# Patient Record
Sex: FEMALE | Race: WHITE | NOT HISPANIC OR LATINO | Employment: OTHER | URBAN - METROPOLITAN AREA
[De-identification: names, ages, dates, MRNs, and addresses within clinical notes are randomized per-mention and may not be internally consistent; named-entity substitution may affect disease eponyms.]

---

## 2019-03-13 DIAGNOSIS — Z00.00 ENCOUNTER FOR WELLNESS EXAMINATION: Primary | ICD-10-CM

## 2019-04-25 ENCOUNTER — OFFICE VISIT (OUTPATIENT)
Dept: OBSTETRICS AND GYNECOLOGY | Facility: CLINIC | Age: 74
End: 2019-04-25
Payer: MEDICARE

## 2019-04-25 VITALS
DIASTOLIC BLOOD PRESSURE: 64 MMHG | HEART RATE: 79 BPM | BODY MASS INDEX: 22.88 KG/M2 | HEIGHT: 68 IN | SYSTOLIC BLOOD PRESSURE: 103 MMHG | WEIGHT: 151 LBS

## 2019-04-25 DIAGNOSIS — N89.8 VAGINAL DISCHARGE: Primary | ICD-10-CM

## 2019-04-25 DIAGNOSIS — Z01.419 PAP SMEAR, AS PART OF ROUTINE GYNECOLOGICAL EXAMINATION: ICD-10-CM

## 2019-04-25 PROCEDURE — G0101 CA SCREEN;PELVIC/BREAST EXAM: HCPCS | Mod: S$GLB,,, | Performed by: OBSTETRICS & GYNECOLOGY

## 2019-04-25 PROCEDURE — G0101 PR CA SCREEN;PELVIC/BREAST EXAM: ICD-10-PCS | Mod: S$GLB,,, | Performed by: OBSTETRICS & GYNECOLOGY

## 2019-04-25 RX ORDER — BLOOD SUGAR DIAGNOSTIC
STRIP MISCELLANEOUS
COMMUNITY
Start: 2019-03-18 | End: 2022-01-20 | Stop reason: SDUPTHER

## 2019-04-25 RX ORDER — DULOXETIN HYDROCHLORIDE 60 MG/1
CAPSULE, DELAYED RELEASE ORAL
COMMUNITY
Start: 2019-02-27 | End: 2022-01-06

## 2019-04-25 RX ORDER — TRAZODONE HYDROCHLORIDE 100 MG/1
TABLET ORAL
COMMUNITY
Start: 2019-01-26 | End: 2022-01-20

## 2019-04-25 RX ORDER — LEVOCETIRIZINE DIHYDROCHLORIDE 5 MG/1
TABLET, FILM COATED ORAL
COMMUNITY
Start: 2019-03-16 | End: 2022-01-20 | Stop reason: SDUPTHER

## 2019-04-25 RX ORDER — BENAZEPRIL HYDROCHLORIDE 5 MG/1
TABLET ORAL
COMMUNITY
Start: 2019-04-02 | End: 2022-01-06

## 2019-04-25 RX ORDER — MONTELUKAST SODIUM 10 MG/1
TABLET ORAL
COMMUNITY
Start: 2019-02-13

## 2019-04-25 RX ORDER — GABAPENTIN 600 MG/1
TABLET ORAL
COMMUNITY
Start: 2019-03-16 | End: 2022-01-07

## 2019-04-25 RX ORDER — HYDROCODONE BITARTRATE AND ACETAMINOPHEN 7.5; 325 MG/1; MG/1
TABLET ORAL
COMMUNITY
Start: 2019-01-18 | End: 2022-01-20

## 2019-04-25 RX ORDER — INSULIN DEGLUDEC 100 U/ML
INJECTION, SOLUTION SUBCUTANEOUS
COMMUNITY
Start: 2019-03-15 | End: 2022-01-20

## 2019-04-25 NOTE — PROGRESS NOTES
Subjective:       Patient ID: Yoko Mann is a 73 y.o. female.    Chief Complaint:  Well Woman and Gynecologic Exam      History of Present Illness  Pt here for gyn annual.  History and past labs reviewed with patient.    Complaints none. Reports some vaginal discharge occasionally       Review of Systems  Review of Systems   Constitutional: Negative for chills and fever.   Respiratory: Negative for shortness of breath.    Cardiovascular: Negative for chest pain.   Gastrointestinal: Negative for abdominal pain, blood in stool, constipation, diarrhea, nausea, vomiting and reflux.   Genitourinary: Positive for vaginal discharge. Negative for dysuria, hematuria, hot flashes, pelvic pain, vaginal bleeding and vaginal dryness.   Musculoskeletal: Negative for arthralgias and joint swelling.   Integumentary:  Negative for rash, hair changes, breast mass, nipple discharge and breast skin changes.   Psychiatric/Behavioral: Negative for depression. The patient is not nervous/anxious.    Breast: Negative for asymmetry, lump, mass, nipple discharge and skin changes          Objective:    Physical Exam:   Constitutional: She appears well-developed and well-nourished. No distress.    HENT:   Head: Normocephalic and atraumatic.    Eyes: Conjunctivae and EOM are normal.    Neck: No tracheal deviation present. No thyromegaly present.    Cardiovascular: Exam reveals no clubbing, no cyanosis and no edema.     Pulmonary/Chest: Effort normal. No respiratory distress.        Abdominal: Soft. She exhibits no distension and no mass. There is no tenderness. There is no rebound and no guarding. No hernia.     Genitourinary: Rectum normal. Pelvic exam was performed with patient supine. There is no rash, tenderness, lesion or injury on the right labia. There is no rash, tenderness, lesion or injury on the left labia. Uterus is absent. Cervix is normal. Right adnexum displays no mass, no tenderness and no fullness. Left adnexum displays no  mass, no tenderness and no fullness. Vaginal discharge (yellow) found.                Skin: She is not diaphoretic. No cyanosis. Nails show no clubbing.         breast exam wnl- no nipple dc, skin changes, masses or lymph nodes palpated bilaterally    Assessment:        1. Vaginal discharge    2. Pap smear, as part of routine gynecological examination                Plan:      Annual   Pap today   MMG UTD   BMD ordered by PCP    Vaccine UTD   Risk assessment for inherited gyn cancer done - neg   RTC  1 year

## 2019-04-25 NOTE — LETTER
April 25, 2019      Abdulaziz Guerrero MD  [address] [address]           [facility]  [address]   [place] [address]  Phone: [phone]  Fax: [phone]          Patient: Yoko Mann   MR Number: [id number]   YOB: 1945   Date of Visit: 4/25/2019       Dear Dr. Abdulaziz Guerrero:    Thank you for referring Yoko Mann to me for evaluation. Attached you will find relevant portions of my assessment and plan of care.    If you have questions, please do not hesitate to call me. I look forward to following Yoko Mann along with you.    Sincerely,    Naz Slade MD    Enclosure  CC:  No Recipients    If you would like to receive this communication electronically, please contact [email or web address] or [phone] to request more information on [employer] [facility] access.    For providers and/or their staff who would like to refer a patient to [facility], please contact us through our one-stop-shop provider referral line, [facility], at [phone].    If you feel you have received this communication in error or would no longer like to receive these types of communications, please e-mail [email or web address]

## 2019-06-27 DIAGNOSIS — N89.8 VAGINAL DISCHARGE: Primary | ICD-10-CM

## 2019-06-27 RX ORDER — METRONIDAZOLE 500 MG/1
500 TABLET ORAL EVERY 12 HOURS
Qty: 14 TABLET | Refills: 0 | Status: SHIPPED | OUTPATIENT
Start: 2019-06-27 | End: 2019-07-04

## 2022-01-06 ENCOUNTER — OFFICE VISIT (OUTPATIENT)
Dept: FAMILY MEDICINE | Facility: CLINIC | Age: 77
End: 2022-01-06
Payer: MEDICARE

## 2022-01-06 VITALS
SYSTOLIC BLOOD PRESSURE: 124 MMHG | DIASTOLIC BLOOD PRESSURE: 69 MMHG | HEART RATE: 90 BPM | WEIGHT: 147 LBS | OXYGEN SATURATION: 96 % | HEIGHT: 68 IN | BODY MASS INDEX: 22.28 KG/M2 | RESPIRATION RATE: 18 BRPM | TEMPERATURE: 98 F

## 2022-01-06 DIAGNOSIS — Z79.4 TYPE 2 DIABETES MELLITUS WITH HYPERGLYCEMIA, WITH LONG-TERM CURRENT USE OF INSULIN: Primary | ICD-10-CM

## 2022-01-06 DIAGNOSIS — Z11.59 ENCOUNTER FOR SCREENING FOR OTHER VIRAL DISEASES: ICD-10-CM

## 2022-01-06 DIAGNOSIS — I10 ESSENTIAL HYPERTENSION: ICD-10-CM

## 2022-01-06 DIAGNOSIS — R07.81 RIB PAIN ON LEFT SIDE: ICD-10-CM

## 2022-01-06 DIAGNOSIS — E78.2 MIXED HYPERLIPIDEMIA: ICD-10-CM

## 2022-01-06 DIAGNOSIS — E11.65 TYPE 2 DIABETES MELLITUS WITH HYPERGLYCEMIA, WITH LONG-TERM CURRENT USE OF INSULIN: Primary | ICD-10-CM

## 2022-01-06 LAB
ABS NRBC COUNT: 0 X 10 3/UL (ref 0–0.01)
ABSOLUTE BASOPHIL: 0.07 X 10 3/UL (ref 0–0.22)
ABSOLUTE EOSINOPHIL: 0.14 X 10 3/UL (ref 0.04–0.54)
ABSOLUTE IMMATURE GRAN: 0.01 X 10 3/UL (ref 0–0.04)
ABSOLUTE LYMPHOCYTE: 2.09 X 10 3/UL (ref 0.86–4.75)
ABSOLUTE MONOCYTE: 0.68 X 10 3/UL (ref 0.22–1.08)
ALBUMIN SERPL-MCNC: 4.7 G/DL (ref 3.5–5.2)
ALBUMIN/GLOB SERPL ELPH: 1.8 {RATIO} (ref 1–2.7)
ALP ISOS SERPL LEV INH-CCNC: 90 U/L (ref 35–105)
ALT (SGPT): 22 U/L (ref 0–33)
ANION GAP SERPL CALC-SCNC: 12 MMOL/L (ref 8–17)
AST SERPL-CCNC: 23 U/L (ref 0–32)
BASOPHILS NFR BLD: 1.1 % (ref 0.2–1.2)
BILIRUBIN, TOTAL: 0.3 MG/DL (ref 0–1.2)
BUN/CREAT SERPL: 10 (ref 6–20)
CALCIUM SERPL-MCNC: 10.3 MG/DL (ref 8.6–10.2)
CARBON DIOXIDE, CO2: 28 MMOL/L (ref 22–29)
CHLORIDE: 102 MMOL/L (ref 98–107)
CHOLEST SERPL-MSCNC: 268 MG/DL (ref 100–200)
CREAT SERPL-MCNC: 0.81 MG/DL (ref 0.5–0.9)
EOSINOPHIL NFR BLD: 2.1 % (ref 0.7–7)
ESTIMATED AVERAGE GLUCOSE: 206 MG/DL
GFR ESTIMATION: 68.93
GLOBULIN: 2.6 G/DL (ref 1.5–4.5)
GLUCOSE: 233 MG/DL (ref 82–115)
HBA1C MFR BLD: 8.8 % (ref 4–6)
HCT VFR BLD AUTO: 40.4 % (ref 37–47)
HCV IGG SERPL QL IA: NONREACTIVE
HDLC SERPL-MCNC: 58 MG/DL
HGB BLD-MCNC: 13.1 G/DL (ref 12–16)
IMMATURE GRANULOCYTES: 0.2 % (ref 0–0.5)
LDL/HDL RATIO: 2.3 (ref 1–3)
LDLC SERPL CALC-MCNC: 135.2 MG/DL (ref 0–100)
LYMPHOCYTES NFR BLD: 32.1 % (ref 19.3–53.1)
MCH RBC QN AUTO: 29.5 PG (ref 27–32)
MCHC RBC AUTO-ENTMCNC: 32.4 G/DL (ref 32–36)
MCV RBC AUTO: 91 FL (ref 82–100)
MONOCYTES NFR BLD: 10.4 % (ref 4.7–12.5)
NEUTROPHILS # BLD AUTO: 3.53 X 10 3/UL (ref 2.15–7.56)
NEUTROPHILS NFR BLD: 54.1 % (ref 34–71.1)
NUCLEATED RED BLOOD CELLS: 0 /100 WBC (ref 0–0.2)
PLATELET # BLD AUTO: 249 X 10 3/UL (ref 135–400)
POTASSIUM: 4.3 MMOL/L (ref 3.5–5.1)
PROT SNV-MCNC: 7.3 G/DL (ref 6.4–8.3)
RBC # BLD AUTO: 4.44 X 10 6/UL (ref 4.2–5.4)
RDW-SD: 43.1 FL (ref 37–54)
SODIUM: 142 MMOL/L (ref 136–145)
TRIGL SERPL-MCNC: 374 MG/DL (ref 0–150)
TSH W/REFLEX TO FT4: 1.4 UIU/ML (ref 0.27–4.2)
UREA NITROGEN (BUN): 8.1 MG/DL (ref 8–23)
WBC # BLD: 6.52 X 10 3/UL (ref 4.3–10.8)

## 2022-01-06 PROCEDURE — 99203 OFFICE O/P NEW LOW 30 MIN: CPT | Mod: S$GLB,,, | Performed by: NURSE PRACTITIONER

## 2022-01-06 PROCEDURE — 99203 PR OFFICE/OUTPT VISIT, NEW, LEVL III, 30-44 MIN: ICD-10-PCS | Mod: S$GLB,,, | Performed by: NURSE PRACTITIONER

## 2022-01-06 RX ORDER — BENAZEPRIL HYDROCHLORIDE 10 MG/1
TABLET ORAL
COMMUNITY
Start: 2021-12-09

## 2022-01-06 RX ORDER — TRAZODONE HYDROCHLORIDE 150 MG/1
150 TABLET ORAL DAILY PRN
COMMUNITY
Start: 2021-12-20 | End: 2022-04-01 | Stop reason: SDUPTHER

## 2022-01-06 RX ORDER — GLIMEPIRIDE 2 MG/1
TABLET ORAL
COMMUNITY
Start: 2021-12-09 | End: 2022-01-20

## 2022-01-06 RX ORDER — DULAGLUTIDE 1.5 MG/.5ML
INJECTION, SOLUTION SUBCUTANEOUS
COMMUNITY
Start: 2021-12-16

## 2022-01-06 RX ORDER — LEFLUNOMIDE 20 MG/1
20 TABLET ORAL DAILY
COMMUNITY
Start: 2021-12-16

## 2022-01-06 RX ORDER — EXENATIDE 2 MG/.85ML
INJECTION, SUSPENSION, EXTENDED RELEASE SUBCUTANEOUS
COMMUNITY
Start: 2021-10-25 | End: 2022-01-20

## 2022-01-06 RX ORDER — DULOXETIN HYDROCHLORIDE 30 MG/1
30 CAPSULE, DELAYED RELEASE ORAL DAILY
COMMUNITY
Start: 2021-10-25 | End: 2022-01-06

## 2022-01-06 RX ORDER — CLOBETASOL PROPIONATE 0.46 MG/ML
SOLUTION TOPICAL
COMMUNITY
Start: 2021-07-16

## 2022-01-06 RX ORDER — CLOBETASOL PROPIONATE 0.5 MG/G
OINTMENT TOPICAL
COMMUNITY
Start: 2021-12-09

## 2022-01-06 NOTE — PROGRESS NOTES
Subjective:       Patient ID: Yoko Mann is a 76 y.o. female.    Chief Complaint: Establish Care (Pt wants to establish care. Pt states she's unhappy with her previous provider. Pt states she broke 5 ribs and they're not healing well. Pt says she still has some sore spots. Pt states she's also having some thoractic back pain. Pt also says she falls a lot. Pt says she has done physical therapy twice. Pt says she wants better health management./)    HPI     Here to establish primary care. She has PMH DM2, depression, allergies, HTN, insomnia.     Broke 5 ribs in Oct 2021 from a fall and was evalated at [facility] 2-3 day. No injury to lung. Still having left sided right rib pain.     Established with interventional pain management Dr Ceballos in late dec, xrays showed fx still present but healing. She has a return appt with Dr Ceballos on Feb 3, 2022.    She does not know the names of all of her medications. She did not bring her medications. She does not carry a medication list. Medical history is very unclear. We will attempt to obtain historical medical records from her previous PCP.   She thinks her last  A1c was around 8%      Review of Systems   Constitutional: Negative for activity change, appetite change and fever.   Respiratory: Negative for chest tightness and shortness of breath.    Cardiovascular: Negative for chest pain and palpitations.   Gastrointestinal: Negative for abdominal pain, diarrhea, nausea and vomiting.   Musculoskeletal: Positive for arthralgias and back pain. Negative for joint swelling and myalgias.   Skin: Negative for color change and rash.   Neurological: Negative for dizziness, weakness, light-headedness and headaches.   Hematological: Negative for adenopathy.   Psychiatric/Behavioral: Negative for dysphoric mood and sleep disturbance. The patient is not nervous/anxious.            Past Medical History:  Past Medical History:   Diagnosis Date    Depression     Diabetes mellitus        Past Surgical History:   Procedure Laterality Date    BACK SURGERY      eyelid      FOOT SURGERY      bilat    HYSTERECTOMY      NECK SURGERY      x2      Review of patient's allergies indicates:   Allergen Reactions    Bee pollens     Penicillins       Current Outpatient Medications   Medication Sig Dispense Refill    levocetirizine (XYZAL) 5 MG tablet       montelukast (SINGULAIR) 10 mg tablet       traZODone (DESYREL) 100 MG tablet       ACCU-CHEK FRANTZ PLUS METER Misc       ACCU-CHEK FRANTZ PLUS TEST STRP Strp       benazepriL (LOTENSIN) 10 MG tablet TAKE 1/2 TABLET BY MOUTH DAILY thank youmike -)      BYDUREON BCISE 2 mg/0.85 mL AtIn INJECT 2 MG UNDER THE SKIN ONCE WEEKLY      clobetasoL (TEMOVATE) 0.05 % external solution APPLY TO THE AFFECTED AREA ON SCALP TWICE DAILY      clobetasol 0.05% (TEMOVATE) 0.05 % Oint APPLY to ear and trunk 2 TIMES A DAY AS NEEDED thank chele -)      glimepiride (AMARYL) 2 MG tablet TAKE 1 TABLET BY MOUTH 2 TIMES A DAY thank youmijami -)      HYDROcodone-acetaminophen (NORCO) 7.5-325 mg per tablet       leflunomide (ARAVA) 20 MG Tab Take 20 mg by mouth once daily.      traZODone (DESYREL) 150 MG tablet Take 150 mg by mouth daily as needed.      TRESIBA FLEXTOUCH U-100 100 unit/mL (3 mL) InPn       TRULICITY 1.5 mg/0.5 mL pen injector INJECT 1.5 MG UNDER THE SKIN ONCE A WEEK       No current facility-administered medications for this visit.     Social History     Socioeconomic History    Marital status: Single   Tobacco Use    Smoking status: Former Smoker     Types: Cigarettes    Smokeless tobacco: Never Used    Tobacco comment: quit 35 years ago   Substance and Sexual Activity    Alcohol use: Not Currently    Drug use: Never    Sexual activity: Not Currently     Partners: Male      Family History   Problem Relation Age of Onset    Diabetes Paternal Grandfather     Diabetes Paternal Grandmother     Tuberculosis Maternal Grandmother     Diabetes  Father     Cancer Father     Anuerysm Mother     Prostate cancer Brother     Stomach cancer Brother     Stroke Brother         Objective:      Physical Exam  Constitutional:       Appearance: She is well-developed and well-nourished.   HENT:      Head: Normocephalic and atraumatic.      Mouth/Throat:      Mouth: Oropharynx is clear and moist.   Eyes:      General: No scleral icterus.     Extraocular Movements: EOM normal.      Conjunctiva/sclera: Conjunctivae normal.      Pupils: Pupils are equal, round, and reactive to light.   Neck:      Thyroid: No thyroid mass.      Trachea: Trachea normal.   Cardiovascular:      Rate and Rhythm: Normal rate and regular rhythm.   Pulmonary:      Effort: Pulmonary effort is normal.      Breath sounds: Normal breath sounds.   Musculoskeletal:         General: No edema.      Cervical back: Normal range of motion and neck supple.   Skin:     General: Skin is warm and dry.   Neurological:      Mental Status: She is alert and oriented to person, place, and time.   Psychiatric:         Mood and Affect: Mood and affect and mood normal.         Behavior: Behavior normal.         Assessment:     1. Type 2 diabetes mellitus with hyperglycemia, with long-term current use of insulin    2. Essential hypertension    3. Mixed hyperlipidemia    4. Rib pain on left side    5. Encounter for screening for other viral diseases      Plan:       PROBLEM LIST     Type 2 diabetes mellitus with hyperglycemia, with long-term current use of insulin  -     CBC Auto Differential  -     Comprehensive Metabolic Panel  -     Lipid Panel  -     TSH w/reflex to FT4  -     Hemoglobin A1C    Essential hypertension  -     CBC Auto Differential  -     Comprehensive Metabolic Panel  -     Lipid Panel  -     TSH w/reflex to FT4  -     Hemoglobin A1C    Mixed hyperlipidemia    Rib pain on left side    Encounter for screening for other viral diseases  -     Hepatitis C antibody        She is unsure of all  medications that she is taking. She will RTC in 2 weeks and bring ALL medications with her. We will also attempt to obtain her historical medical records from her previous provider. In the meantime, will obtain baseline labs.

## 2022-01-20 ENCOUNTER — OFFICE VISIT (OUTPATIENT)
Dept: FAMILY MEDICINE | Facility: CLINIC | Age: 77
End: 2022-01-20
Payer: MEDICARE

## 2022-01-20 VITALS
BODY MASS INDEX: 22.58 KG/M2 | TEMPERATURE: 97 F | OXYGEN SATURATION: 96 % | RESPIRATION RATE: 18 BRPM | DIASTOLIC BLOOD PRESSURE: 64 MMHG | WEIGHT: 149 LBS | SYSTOLIC BLOOD PRESSURE: 121 MMHG | HEART RATE: 94 BPM | HEIGHT: 68 IN

## 2022-01-20 DIAGNOSIS — E11.65 TYPE 2 DIABETES MELLITUS WITH HYPERGLYCEMIA, WITH LONG-TERM CURRENT USE OF INSULIN: Primary | Chronic | ICD-10-CM

## 2022-01-20 DIAGNOSIS — I10 ESSENTIAL HYPERTENSION: Chronic | ICD-10-CM

## 2022-01-20 DIAGNOSIS — Z79.4 TYPE 2 DIABETES MELLITUS WITH HYPERGLYCEMIA, WITH LONG-TERM CURRENT USE OF INSULIN: Primary | Chronic | ICD-10-CM

## 2022-01-20 DIAGNOSIS — M06.9 RHEUMATOID ARTHRITIS INVOLVING MULTIPLE SITES, UNSPECIFIED WHETHER RHEUMATOID FACTOR PRESENT: Chronic | ICD-10-CM

## 2022-01-20 DIAGNOSIS — E78.2 MIXED HYPERLIPIDEMIA: Chronic | ICD-10-CM

## 2022-01-20 DIAGNOSIS — J30.2 SEASONAL ALLERGIES: ICD-10-CM

## 2022-01-20 DIAGNOSIS — R07.81 RIB PAIN ON LEFT SIDE: ICD-10-CM

## 2022-01-20 PROCEDURE — 99214 OFFICE O/P EST MOD 30 MIN: CPT | Mod: S$GLB,,, | Performed by: NURSE PRACTITIONER

## 2022-01-20 PROCEDURE — 99214 PR OFFICE/OUTPT VISIT, EST, LEVL IV, 30-39 MIN: ICD-10-PCS | Mod: S$GLB,,, | Performed by: NURSE PRACTITIONER

## 2022-01-20 RX ORDER — CELECOXIB 200 MG/1
200 CAPSULE ORAL 2 TIMES DAILY PRN
COMMUNITY
End: 2022-04-01

## 2022-01-20 RX ORDER — BLOOD SUGAR DIAGNOSTIC
1 STRIP MISCELLANEOUS 2 TIMES DAILY
Qty: 200 STRIP | Refills: 3 | Status: SHIPPED | OUTPATIENT
Start: 2022-01-20

## 2022-01-20 RX ORDER — LEVOCETIRIZINE DIHYDROCHLORIDE 5 MG/1
5 TABLET, FILM COATED ORAL NIGHTLY
Qty: 90 TABLET | Refills: 3 | Status: SHIPPED | OUTPATIENT
Start: 2022-01-20

## 2022-01-20 RX ORDER — DULOXETIN HYDROCHLORIDE 60 MG/1
60 CAPSULE, DELAYED RELEASE ORAL DAILY
COMMUNITY

## 2022-01-20 RX ORDER — GLIMEPIRIDE 4 MG/1
4 TABLET ORAL 2 TIMES DAILY
Qty: 180 TABLET | Refills: 3 | Status: SHIPPED | OUTPATIENT
Start: 2022-01-20

## 2022-01-20 RX ORDER — MINOCYCLINE HYDROCHLORIDE 100 MG/1
100 CAPSULE ORAL 2 TIMES DAILY
COMMUNITY

## 2022-01-20 RX ORDER — GLIMEPIRIDE 4 MG/1
4 TABLET ORAL
Qty: 180 TABLET | Refills: 3 | Status: SHIPPED | OUTPATIENT
Start: 2022-01-20 | End: 2022-01-20 | Stop reason: SDUPTHER

## 2022-01-20 RX ORDER — INSULIN GLARGINE 100 [IU]/ML
15 INJECTION, SOLUTION SUBCUTANEOUS NIGHTLY
Qty: 13.5 ML | Refills: 3 | Status: SHIPPED | OUTPATIENT
Start: 2022-01-20 | End: 2023-01-20

## 2022-01-20 RX ORDER — ATORVASTATIN CALCIUM 20 MG/1
20 TABLET, FILM COATED ORAL DAILY
Qty: 90 TABLET | Refills: 3 | Status: SHIPPED | OUTPATIENT
Start: 2022-01-20 | End: 2023-01-20

## 2022-01-20 NOTE — PROGRESS NOTES
Subjective:       Patient ID: Yoko Mann is a 76 y.o. female.    Chief Complaint: Follow-up (Pt is here for a follow up and to go over lab results.)    HPI     Here to establish primary care. She has PMH DM2, depression, allergies, HTN, HLD, insomnia.      Broke 5 ribs in Oct 2021 from a fall and was evalated at [facility] 2-3 day. No injury to lung. Still having left sided right rib pain.      Established with interventional pain management Dr Ceballos in late dec, xrays showed fx still present but healing. She has a return appt with Dr Ceballos on Feb 3, 2022.    She does not feel that her diabetes has been controlled. She believes her last A1c was around 8.0%. She reports receiving several samples of insulins at her previous pcp--but was never told how to take the medication or how much to take. She has several sample syringes with her today of Soliqua Tresiba...       Review of Systems   Constitutional: Negative for activity change, appetite change and fever.   Respiratory: Negative for chest tightness and shortness of breath.    Cardiovascular: Negative for chest pain and palpitations.   Gastrointestinal: Negative for abdominal pain, diarrhea, nausea and vomiting.   Musculoskeletal: Positive for arthralgias and back pain. Negative for joint swelling and myalgias.   Skin: Negative for color change and rash.   Neurological: Negative for dizziness, weakness, light-headedness and headaches.   Hematological: Negative for adenopathy.   Psychiatric/Behavioral: Negative for dysphoric mood and sleep disturbance. The patient is not nervous/anxious.            Past Medical History:  Past Medical History:   Diagnosis Date    Depression     Diabetes mellitus       Past Surgical History:   Procedure Laterality Date    BACK SURGERY      eyelid      FOOT SURGERY      bilat    HYSTERECTOMY      NECK SURGERY      x2      Review of patient's allergies indicates:   Allergen Reactions    Bee pollens     Penicillins        Current Outpatient Medications   Medication Sig Dispense Refill    ACCU-CHEK FRANTZ PLUS METER Misc       benazepriL (LOTENSIN) 10 MG tablet TAKE 1/2 TABLET BY MOUTH DAILY thank chele -)      celecoxib (CELEBREX) 200 MG capsule Take 200 mg by mouth 2 (two) times daily as needed for Pain.      clobetasoL (TEMOVATE) 0.05 % external solution APPLY TO THE AFFECTED AREA ON SCALP TWICE DAILY      clobetasol 0.05% (TEMOVATE) 0.05 % Oint APPLY to ear and trunk 2 TIMES A DAY AS NEEDED thank chele -)      DULoxetine (CYMBALTA) 60 MG capsule Take 60 mg by mouth once daily.      leflunomide (ARAVA) 20 MG Tab Take 20 mg by mouth once daily.      minocycline (MINOCIN,DYNACIN) 100 MG capsule Take 100 mg by mouth 2 (two) times daily.      montelukast (SINGULAIR) 10 mg tablet       traZODone (DESYREL) 150 MG tablet Take 150 mg by mouth daily as needed.      TRULICITY 1.5 mg/0.5 mL pen injector INJECT 1.5 MG UNDER THE SKIN ONCE A WEEK      ACCU-CHEK FRANTZ PLUS TEST STRP Strp 1 strip by abdominal subcutaneous route 2 (two) times a day. 200 strip 3    atorvastatin (LIPITOR) 20 MG tablet Take 1 tablet (20 mg total) by mouth once daily. 90 tablet 3    glimepiride (AMARYL) 4 MG tablet Take 1 tablet (4 mg total) by mouth 2 (two) times a day. 180 tablet 3    insulin (LANTUS SOLOSTAR U-100 INSULIN) glargine 100 units/mL (3mL) SubQ pen Inject 15 Units into the skin every evening. 13.5 mL 3    levocetirizine (XYZAL) 5 MG tablet Take 1 tablet (5 mg total) by mouth every evening. 90 tablet 3     No current facility-administered medications for this visit.     Social History     Socioeconomic History    Marital status: Single   Tobacco Use    Smoking status: Former Smoker     Types: Cigarettes    Smokeless tobacco: Never Used    Tobacco comment: quit 35 years ago   Substance and Sexual Activity    Alcohol use: Not Currently    Drug use: Never    Sexual activity: Not Currently     Partners: Male      Family History    Problem Relation Age of Onset    Diabetes Paternal Grandfather     Diabetes Paternal Grandmother     Tuberculosis Maternal Grandmother     Diabetes Father     Cancer Father     Anuerysm Mother     Prostate cancer Brother     Stomach cancer Brother     Stroke Brother         Objective:      Physical Exam  Constitutional:       Appearance: She is well-developed.   HENT:      Head: Normocephalic and atraumatic.   Eyes:      General: No scleral icterus.     Conjunctiva/sclera: Conjunctivae normal.      Pupils: Pupils are equal, round, and reactive to light.   Neck:      Thyroid: No thyroid mass.      Trachea: Trachea normal.   Cardiovascular:      Rate and Rhythm: Normal rate and regular rhythm.   Pulmonary:      Effort: Pulmonary effort is normal.      Breath sounds: Normal breath sounds.   Musculoskeletal:      Cervical back: Normal range of motion and neck supple.   Skin:     General: Skin is warm and dry.   Neurological:      Mental Status: She is alert and oriented to person, place, and time.   Psychiatric:         Mood and Affect: Mood normal.         Behavior: Behavior normal.         Assessment:     1. Type 2 diabetes mellitus with hyperglycemia, with long-term current use of insulin Sub-optimally controlled   2. Essential hypertension Stable   3. Mixed hyperlipidemia Active   4. Rheumatoid arthritis involving multiple sites, unspecified whether rheumatoid factor present Stable   5. Seasonal allergies    6. Rib pain on left side      Plan:       PROBLEM LIST     Type 2 diabetes mellitus with hyperglycemia, with long-term current use of insulin  Comments:  A1c 8.8%--increase glimepiride; start basal insulin; continue Trulicity; repeat A1c in 12 wk  Orders:  -     Discontinue: glimepiride (AMARYL) 4 MG tablet; Take 1 tablet (4 mg total) by mouth daily with breakfast.  Dispense: 180 tablet; Refill: 3  -     insulin (LANTUS SOLOSTAR U-100 INSULIN) glargine 100 units/mL (3mL) SubQ pen; Inject 15 Units into  the skin every evening.  Dispense: 13.5 mL; Refill: 3  -     ACCU-CHEK FRANTZ PLUS TEST STRP Strp; 1 strip by abdominal subcutaneous route 2 (two) times a day.  Dispense: 200 strip; Refill: 3  -     atorvastatin (LIPITOR) 20 MG tablet; Take 1 tablet (20 mg total) by mouth once daily.  Dispense: 90 tablet; Refill: 3  -     glimepiride (AMARYL) 4 MG tablet; Take 1 tablet (4 mg total) by mouth 2 (two) times a day.  Dispense: 180 tablet; Refill: 3    Essential hypertension    Mixed hyperlipidemia  Comments:  start Atorvastatin hs    Rheumatoid arthritis involving multiple sites, unspecified whether rheumatoid factor present    Seasonal allergies  -     levocetirizine (XYZAL) 5 MG tablet; Take 1 tablet (5 mg total) by mouth every evening.  Dispense: 90 tablet; Refill: 3    Rib pain on left side  Comments:  interventional pain mgmt appt Scheduled w/ Dr Ceballos Feb 3    reviewed lab results w/ her in clinic today

## 2022-04-01 ENCOUNTER — OFFICE VISIT (OUTPATIENT)
Dept: FAMILY MEDICINE | Facility: CLINIC | Age: 77
End: 2022-04-01
Payer: MEDICARE

## 2022-04-01 VITALS
TEMPERATURE: 97 F | WEIGHT: 136 LBS | HEART RATE: 78 BPM | BODY MASS INDEX: 20.61 KG/M2 | HEIGHT: 68 IN | OXYGEN SATURATION: 95 % | RESPIRATION RATE: 18 BRPM

## 2022-04-01 DIAGNOSIS — M54.6 PAIN IN THORACIC SPINE: ICD-10-CM

## 2022-04-01 DIAGNOSIS — Z13.820 OSTEOPOROSIS SCREENING: ICD-10-CM

## 2022-04-01 DIAGNOSIS — M81.0 OSTEOPOROSIS, POST-MENOPAUSAL: ICD-10-CM

## 2022-04-01 DIAGNOSIS — F51.01 PRIMARY INSOMNIA: Primary | ICD-10-CM

## 2022-04-01 DIAGNOSIS — Z91.199 MEDICALLY NONCOMPLIANT: ICD-10-CM

## 2022-04-01 PROCEDURE — 99213 OFFICE O/P EST LOW 20 MIN: CPT | Mod: S$GLB,,, | Performed by: NURSE PRACTITIONER

## 2022-04-01 PROCEDURE — 99213 PR OFFICE/OUTPT VISIT, EST, LEVL III, 20-29 MIN: ICD-10-PCS | Mod: S$GLB,,, | Performed by: NURSE PRACTITIONER

## 2022-04-01 RX ORDER — TRAZODONE HYDROCHLORIDE 150 MG/1
150 TABLET ORAL DAILY PRN
Qty: 30 TABLET | Refills: 3 | Status: SHIPPED | OUTPATIENT
Start: 2022-04-01

## 2022-04-01 RX ORDER — GABAPENTIN 300 MG/1
300 CAPSULE ORAL 2 TIMES DAILY
COMMUNITY
Start: 2022-03-31

## 2022-04-01 RX ORDER — MINOCYCLINE HYDROCHLORIDE 100 MG/1
100 CAPSULE ORAL 2 TIMES DAILY
Qty: 60 CAPSULE | Refills: 3 | Status: CANCELLED | OUTPATIENT
Start: 2022-04-01

## 2022-04-01 NOTE — PROGRESS NOTES
Subjective:       Patient ID: Yoko Mann is a 76 y.o. female.    Chief Complaint: Follow-up (Pt is here for a check up. Pt states is concerned about her recent weight loss. Pt states she had some steroid shots in her back, neck, and hand and she believes it made her sugar shoot up. Pt also needs a refill.)    She has PMH DM2, depression, allergies, HTN, HLD, insomnia.      Broke 5 ribs in Oct 2021 from a fall and was evalated at [facility] 2-3 day. No injury to lung. Still having left sided right rib pain.      Established with interventional pain management Dr Ceballos in late dec, xrays showed fx still present but healing. Recently received cervical injection from Dr Ceballos.     At her previous appt, her A1c was 8.8%. Her diabetic meds were adjusted and she was started on a basal insulin. Today her A1c is 7.5%    Today her main complaint is thoracic back pain. Present since Oct 2021. She is requesting MRI. I told her that we need to try physical therapy first. She was angry, rolling her eyes while I am talking to her. She is resistant to physical therapy or any other suggestions. She is argumentative during our entire visit. I tried discussed the importance of health screens. She is resistant to all recommendations. At this juncture, I have asked her to find a different provider.            Review of Systems   Constitutional: Negative for activity change, appetite change and fever.   Respiratory: Negative for chest tightness and shortness of breath.    Cardiovascular: Negative for chest pain and palpitations.   Gastrointestinal: Negative for abdominal pain, diarrhea, nausea and vomiting.   Musculoskeletal: Positive for arthralgias and back pain. Negative for joint swelling and myalgias.   Skin: Negative for color change and rash.   Neurological: Negative for dizziness, weakness, light-headedness and headaches.   Hematological: Negative for adenopathy.   Psychiatric/Behavioral: Negative for dysphoric mood and  sleep disturbance. The patient is not nervous/anxious.            Past Medical History:  Past Medical History:   Diagnosis Date    Depression     Diabetes mellitus       Past Surgical History:   Procedure Laterality Date    BACK SURGERY      eyelid      FOOT SURGERY      bilat    HYSTERECTOMY      NECK SURGERY      x2      Review of patient's allergies indicates:   Allergen Reactions    Bee pollens     Penicillins       Current Outpatient Medications   Medication Sig Dispense Refill    ACCU-CHEK FRANTZ PLUS METER Misc       ACCU-CHEK FRANTZ PLUS TEST STRP Strp 1 strip by abdominal subcutaneous route 2 (two) times a day. 200 strip 3    atorvastatin (LIPITOR) 20 MG tablet Take 1 tablet (20 mg total) by mouth once daily. 90 tablet 3    benazepriL (LOTENSIN) 10 MG tablet TAKE 1/2 TABLET BY MOUTH DAILY thank chele -)      clobetasoL (TEMOVATE) 0.05 % external solution APPLY TO THE AFFECTED AREA ON SCALP TWICE DAILY      clobetasol 0.05% (TEMOVATE) 0.05 % Oint APPLY to ear and trunk 2 TIMES A DAY AS NEEDED thank chele -)      DULoxetine (CYMBALTA) 60 MG capsule Take 60 mg by mouth once daily.      gabapentin (NEURONTIN) 300 MG capsule Take 300 mg by mouth 2 (two) times a day.      glimepiride (AMARYL) 4 MG tablet Take 1 tablet (4 mg total) by mouth 2 (two) times a day. 180 tablet 3    insulin (LANTUS SOLOSTAR U-100 INSULIN) glargine 100 units/mL (3mL) SubQ pen Inject 15 Units into the skin every evening. 13.5 mL 3    leflunomide (ARAVA) 20 MG Tab Take 20 mg by mouth once daily.      levocetirizine (XYZAL) 5 MG tablet Take 1 tablet (5 mg total) by mouth every evening. 90 tablet 3    minocycline (MINOCIN,DYNACIN) 100 MG capsule Take 100 mg by mouth 2 (two) times daily.      montelukast (SINGULAIR) 10 mg tablet       TRULICITY 1.5 mg/0.5 mL pen injector INJECT 1.5 MG UNDER THE SKIN ONCE A WEEK      celecoxib (CELEBREX) 200 MG capsule Take 200 mg by mouth 2 (two) times daily as needed for Pain.       traZODone (DESYREL) 150 MG tablet Take 1 tablet (150 mg total) by mouth daily as needed for Insomnia. 30 tablet 3     No current facility-administered medications for this visit.     Social History     Socioeconomic History    Marital status: Single   Tobacco Use    Smoking status: Former Smoker     Types: Cigarettes    Smokeless tobacco: Never Used    Tobacco comment: quit 35 years ago   Substance and Sexual Activity    Alcohol use: Not Currently    Drug use: Never    Sexual activity: Not Currently     Partners: Male      Family History   Problem Relation Age of Onset    Diabetes Paternal Grandfather     Diabetes Paternal Grandmother     Tuberculosis Maternal Grandmother     Diabetes Father     Cancer Father     Anuerysm Mother     Prostate cancer Brother     Stomach cancer Brother     Stroke Brother         Objective:      Physical Exam  Constitutional:       Appearance: She is well-developed.   HENT:      Head: Normocephalic and atraumatic.   Eyes:      General: No scleral icterus.     Conjunctiva/sclera: Conjunctivae normal.      Pupils: Pupils are equal, round, and reactive to light.   Neck:      Thyroid: No thyroid mass.      Trachea: Trachea normal.   Cardiovascular:      Rate and Rhythm: Normal rate and regular rhythm.   Pulmonary:      Effort: Pulmonary effort is normal.      Breath sounds: Normal breath sounds.   Musculoskeletal:         General: Tenderness (diffuse thoracic pain) present.      Cervical back: Normal range of motion and neck supple.   Skin:     General: Skin is warm and dry.   Neurological:      Mental Status: She is alert and oriented to person, place, and time.   Psychiatric:         Mood and Affect: Mood normal.         Behavior: Behavior normal.         Assessment:     1. Primary insomnia    2. Pain in thoracic spine    3. Medically noncompliant    4. Osteoporosis screening    5. Osteoporosis, post-menopausal      Plan:       PROBLEM LIST     Primary insomnia  -      traZODone (DESYREL) 150 MG tablet; Take 1 tablet (150 mg total) by mouth daily as needed for Insomnia.  Dispense: 30 tablet; Refill: 3    Pain in thoracic spine  -     MRI Thoracic Spine Without Contrast; Future; Expected date: 04/01/2022    Medically noncompliant    Osteoporosis screening  -     DXA Bone Density Spine And Hip; Future; Expected date: 04/01/2022    Osteoporosis, post-menopausal  -     DXA Bone Density Spine And Hip; Future; Expected date: 04/01/2022    Other orders  -     Cancel: Ambulatory referral/consult to Gastroenterology; Future; Expected date: 04/08/2022

## 2024-08-02 ENCOUNTER — TELEPHONE (OUTPATIENT)
Dept: UROLOGY | Facility: CLINIC | Age: 79
End: 2024-08-02
Payer: MEDICARE

## 2024-08-02 DIAGNOSIS — N28.9 KIDNEY LESION: Primary | ICD-10-CM

## 2024-08-13 ENCOUNTER — OFFICE VISIT (OUTPATIENT)
Dept: UROLOGY | Facility: CLINIC | Age: 79
End: 2024-08-13
Payer: MEDICARE

## 2024-08-13 VITALS
WEIGHT: 132.63 LBS | HEIGHT: 68 IN | HEART RATE: 87 BPM | BODY MASS INDEX: 20.1 KG/M2 | DIASTOLIC BLOOD PRESSURE: 55 MMHG | SYSTOLIC BLOOD PRESSURE: 105 MMHG

## 2024-08-13 DIAGNOSIS — N28.9 KIDNEY LESION: ICD-10-CM

## 2024-08-13 PROCEDURE — 99204 OFFICE O/P NEW MOD 45 MIN: CPT | Mod: S$GLB,,, | Performed by: UROLOGY

## 2024-08-13 RX ORDER — DUPILUMAB 300 MG/2ML
INJECTION, SOLUTION SUBCUTANEOUS
COMMUNITY
Start: 2024-07-29

## 2024-08-13 RX ORDER — ISOPROPYL ALCOHOL 70 ML/100ML
SWAB TOPICAL
COMMUNITY
Start: 2024-06-20

## 2024-08-13 RX ORDER — INSULIN ASPART 100 [IU]/ML
INJECTION, SOLUTION INTRAVENOUS; SUBCUTANEOUS
COMMUNITY
Start: 2024-06-20

## 2024-08-13 RX ORDER — GLIPIZIDE 5 MG/1
TABLET, FILM COATED, EXTENDED RELEASE ORAL
COMMUNITY
Start: 2024-07-23

## 2024-08-13 RX ORDER — DULAGLUTIDE 0.75 MG/.5ML
INJECTION, SOLUTION SUBCUTANEOUS
COMMUNITY
Start: 2024-06-20

## 2024-08-13 RX ORDER — PEN NEEDLE, DIABETIC 32GX 5/32"
NEEDLE, DISPOSABLE MISCELLANEOUS
COMMUNITY
Start: 2024-07-17

## 2024-08-13 NOTE — PROGRESS NOTES
"Subjective:       Patient ID: Yoko Mann is a 79 y.o. female.    Chief Complaint: No chief complaint on file.      HPI:  79-year-old female referred for a 2.3 cm upper pole right renal lesion consistent with malignancy unfortunately I do not have the images available and it does not state whether it is anterior posterior patient is here for further discussion    Past Medical History:   Past Medical History:   Diagnosis Date    Depression     Diabetes mellitus        Past Surgical Historical:   Past Surgical History:   Procedure Laterality Date    BACK SURGERY      eyelid      FOOT SURGERY      bilat    HYSTERECTOMY      NECK SURGERY      x2        Medications:   Medication List with Changes/Refills   Current Medications    CLOBETASOL (TEMOVATE) 0.05 % EXTERNAL SOLUTION    APPLY TO THE AFFECTED AREA ON SCALP TWICE DAILY    DROPSAFE ALCOHOL PREP PADS PADM        DUPIXENT  MG/2 ML PNIJ        EASY TOUCH 32 GAUGE X 5/32" NDLE    use 1 needle SUBCUTANEOUSLY 4 TIMES A DAY    GLIMEPIRIDE (AMARYL) 4 MG TABLET    Take 1 tablet (4 mg total) by mouth 2 (two) times a day.    GLIPIZIDE 5 MG TR24        NOVOLOG FLEXPEN U-100 INSULIN 100 UNIT/ML (3 ML) INPN PEN        TRULICITY 0.75 MG/0.5 ML PEN INJECTOR        TRULICITY 1.5 MG/0.5 ML PEN INJECTOR    INJECT 1.5 MG UNDER THE SKIN ONCE A WEEK   Discontinued Medications    ACCU-CHEK FRANTZ PLUS METER MISC        ACCU-CHEK FRANTZ PLUS TEST STRP STRP    1 strip by abdominal subcutaneous route 2 (two) times a day.    ATORVASTATIN (LIPITOR) 20 MG TABLET    Take 1 tablet (20 mg total) by mouth once daily.    BENAZEPRIL (LOTENSIN) 10 MG TABLET    TAKE 1/2 TABLET BY MOUTH DAILY thank youmike -)    CLOBETASOL 0.05% (TEMOVATE) 0.05 % OINT    APPLY to ear and trunk 2 TIMES A DAY AS NEEDED thank chele -)    DULOXETINE (CYMBALTA) 60 MG CAPSULE    Take 60 mg by mouth once daily.    GABAPENTIN (NEURONTIN) 300 MG CAPSULE    Take 300 mg by mouth 2 (two) times a day.    INSULIN (LANTUS " SOLOSTAR U-100 INSULIN) GLARGINE 100 UNITS/ML (3ML) SUBQ PEN    Inject 15 Units into the skin every evening.    LEFLUNOMIDE (ARAVA) 20 MG TAB    Take 20 mg by mouth once daily.    LEVOCETIRIZINE (XYZAL) 5 MG TABLET    Take 1 tablet (5 mg total) by mouth every evening.    MINOCYCLINE (MINOCIN,DYNACIN) 100 MG CAPSULE    Take 100 mg by mouth 2 (two) times daily.    MONTELUKAST (SINGULAIR) 10 MG TABLET        TRAZODONE (DESYREL) 150 MG TABLET    Take 1 tablet (150 mg total) by mouth daily as needed for Insomnia.        Past Social History:   Social History     Socioeconomic History    Marital status: Single   Tobacco Use    Smoking status: Former     Types: Cigarettes    Smokeless tobacco: Never    Tobacco comments:     quit 35 years ago   Substance and Sexual Activity    Alcohol use: Not Currently    Drug use: Never    Sexual activity: Not Currently     Partners: Male       Allergies:   Review of patient's allergies indicates:   Allergen Reactions    Bee pollens     Ciprofloxacin     Penicillin     Penicillins         Family History:   Family History   Problem Relation Name Age of Onset    Diabetes Paternal Grandfather      Diabetes Paternal Grandmother      Tuberculosis Maternal Grandmother      Diabetes Father      Cancer Father      Anuerysm Mother      Prostate cancer Brother      Stomach cancer Brother      Stroke Brother          Review of Systems:  Review of Systems   Constitutional:  Negative for activity change and appetite change.   HENT:  Negative for congestion and dental problem.    Respiratory:  Negative for chest tightness and shortness of breath.    Cardiovascular:  Negative for chest pain.   Gastrointestinal:  Negative for abdominal distention and abdominal pain.   Genitourinary:  Negative for decreased urine volume, difficulty urinating, dyspareunia, dysuria, enuresis, flank pain, frequency, genital sores, hematuria, pelvic pain and urgency.   Musculoskeletal:  Negative for back pain and neck pain.    Allergic/Immunologic: Negative for immunocompromised state.   Neurological:  Negative for dizziness.   Hematological:  Negative for adenopathy.   Psychiatric/Behavioral:  Negative for agitation, behavioral problems and confusion.        Physical Exam:  Physical Exam  Constitutional:       Appearance: She is well-developed.   HENT:      Head: Normocephalic and atraumatic.      Right Ear: External ear normal.      Left Ear: External ear normal.   Eyes:      Conjunctiva/sclera: Conjunctivae normal.   Neck:      Vascular: No JVD.   Cardiovascular:      Rate and Rhythm: Normal rate and regular rhythm.   Pulmonary:      Effort: Pulmonary effort is normal. No respiratory distress.      Breath sounds: Normal breath sounds. No wheezing.   Abdominal:      General: There is no distension.      Palpations: Abdomen is soft.      Tenderness: There is no abdominal tenderness. There is no rebound.   Musculoskeletal:         General: Normal range of motion.      Cervical back: Normal range of motion.   Skin:     General: Skin is warm and dry.      Findings: No erythema or rash.   Neurological:      Mental Status: She is alert and oriented to person, place, and time.   Psychiatric:         Behavior: Behavior normal.         Assessment/Plan:       Problem List Items Addressed This Visit    None  Visit Diagnoses       Kidney lesion                   2.3 cm upper pole renal lesion:   We had a long discussion about the importance of knowing the location of the mass this may be very amenable to cryoablation.  We will track down the images and call the patient back with the plan which is tentatively cryoablation

## 2024-08-16 ENCOUNTER — TELEPHONE (OUTPATIENT)
Dept: UROLOGY | Facility: CLINIC | Age: 79
End: 2024-08-16
Payer: MEDICARE

## 2024-08-16 NOTE — TELEPHONE ENCOUNTER
Contacted pt, advised to give them a few days. Advised that if she does not hear from them by the middle of next week to give us a callback. Pt verbalized understanding. BJP     ----- Message from Kim Villegas sent at 8/16/2024  1:38 PM CDT -----  Regarding: Call Back  Contact: patient  Per phone call with patient, she stated that she has not heard from the surgery clinic and if you get a  call return  and no one  answer please leave a detail message at [phone] (home).    Thanks,  SJ

## 2024-08-20 ENCOUNTER — TELEPHONE (OUTPATIENT)
Dept: UROLOGY | Facility: CLINIC | Age: 79
End: 2024-08-20
Payer: MEDICARE

## 2024-08-20 NOTE — TELEPHONE ENCOUNTER
Spoke with pt and informed IR has been attempting to contact her. Regan nurse with IR is on phone with her now.    ----- Message from Kim Villegas sent at 8/20/2024 12:57 PM CDT -----  Regarding: Procedure  Contact: patient  Per phone call with patient, she stated that she is suppose to get a phone call regarding to have an appointment to have the ablation of the kidney to be done and she has not received a call at this time. If no answer please leave a detail message.   Please return call at [phone] (home).    Thanks,  SJ

## 2024-09-10 LAB
ABS NRBC COUNT: 0 THOU/UL (ref 0–0.01)
ABSOLUTE BASOPHIL: 0.1 10*3/UL (ref 0–0.3)
ABSOLUTE EOSINOPHIL: 0.2 10*3/UL (ref 0–0.6)
ABSOLUTE IMMATURE GRAN: 0.03 THOU/UL (ref 0–0.03)
ABSOLUTE LYMPHOCYTE: 2.1 10*3/UL (ref 1.2–4)
ABSOLUTE MONOCYTE: 0.6 10*3/UL (ref 0.1–0.8)
ALBUMIN SERPL BCP-MCNC: 3.8 G/DL (ref 3.4–5)
ALP SERPL-CCNC: 75 U/L (ref 45–117)
ALT SERPL W P-5'-P-CCNC: 24 U/L (ref 13–56)
ANION GAP SERPL CALC-SCNC: 6 MMOL/L (ref 3–11)
APTT PPP: 28.7 SEC (ref 26–38.7)
AST SERPL-CCNC: 22 U/L (ref 15–37)
BASOPHILS NFR BLD: 1.1 % (ref 0–3)
BILIRUB SERPL-MCNC: 0.3 MG/DL (ref 0.2–1)
BUN SERPL-MCNC: 25 MG/DL (ref 7–18)
BUN/CREAT SERPL: 37.31 RATIO
CALCIUM SERPL-MCNC: 10 MG/DL (ref 8.5–10.1)
CHLORIDE SERPL-SCNC: 104 MMOL/L (ref 98–107)
CO2 SERPL-SCNC: 27 MMOL/L (ref 21–32)
CREAT SERPL-MCNC: 0.67 MG/DL (ref 0.55–1.02)
EOSINOPHIL NFR BLD: 2.3 % (ref 0–6)
ERYTHROCYTE [DISTWIDTH] IN BLOOD BY AUTOMATED COUNT: 12.3 % (ref 0–15.5)
GFR ESTIMATION: > 60
GLUCOSE SERPL-MCNC: 154 MG/DL (ref 74–106)
HCT VFR BLD AUTO: 41 % (ref 37–47)
HGB BLD-MCNC: 13.5 G/DL (ref 12–16)
IMMATURE GRANULOCYTES: 0.5 % (ref 0–0.43)
INR PPP: 1 INR (ref 0.9–1.1)
LYMPHOCYTES NFR BLD: 31.2 % (ref 20–45)
MCH RBC QN AUTO: 30.1 PG (ref 27–32)
MCHC RBC AUTO-ENTMCNC: 32.9 % (ref 32–36)
MCV RBC AUTO: 91.5 FL (ref 80–99)
MONOCYTES NFR BLD: 9.7 % (ref 2–10)
NEUTROPHILS # BLD AUTO: 3.6 10*3/UL (ref 1.4–7)
NEUTROPHILS NFR BLD: 55.2 % (ref 50–80)
NUCLEATED RED BLOOD CELLS: 0 % (ref 0–0.2)
PLATELETS: 275 10*3/UL (ref 130–400)
PMV BLD AUTO: 10.2 FL (ref 9.2–12.2)
POTASSIUM SERPL-SCNC: 4.4 MMOL/L (ref 3.5–5.1)
PROT SERPL-MCNC: 7.4 G/DL (ref 6.4–8.2)
PROTHROMBIN TIME: 11.1 SEC (ref 10.2–12.9)
RBC # BLD AUTO: 4.48 10*6/UL (ref 4.2–5.4)
SODIUM BLD-SCNC: 137 MMOL/L (ref 131–143)
WBC # BLD: 6.6 10*3/UL (ref 4.5–10)

## 2024-09-12 ENCOUNTER — OUTSIDE PLACE OF SERVICE (OUTPATIENT)
Dept: INTERVENTIONAL RADIOLOGY/VASCULAR | Facility: CLINIC | Age: 79
End: 2024-09-12
Payer: MEDICARE

## 2024-09-12 LAB
GLUCOSE SERPL-MCNC: 143 MG/DL (ref 70–105)
GLUCOSE SERPL-MCNC: 72 MG/DL (ref 70–105)
GLUCOSE SERPL-MCNC: 82 MG/DL (ref 70–105)

## 2024-09-20 ENCOUNTER — TELEPHONE (OUTPATIENT)
Dept: UROLOGY | Facility: CLINIC | Age: 79
End: 2024-09-20
Payer: MEDICARE

## 2024-09-20 NOTE — TELEPHONE ENCOUNTER
Attempted to contact pt to schedule to discuss results of path report, no answer LMTRC. PT needs to be scheduled this coming week Tues/Thurs. VIVIANA

## 2024-09-23 ENCOUNTER — TELEPHONE (OUTPATIENT)
Dept: UROLOGY | Facility: CLINIC | Age: 79
End: 2024-09-23
Payer: MEDICARE

## 2024-09-23 NOTE — TELEPHONE ENCOUNTER
Pt scheduled for nila with Dr. Zamora for path results.    ----- Message from Yoko Sanchez sent at 9/23/2024  8:47 AM CDT -----  Contact: pt  Pt returning a calling a call for follow up for test results no appt available until October and she can be reached at [phone].  Please leave a message if you do not get pt of the appt time.     Thanks,

## 2024-09-26 ENCOUNTER — OFFICE VISIT (OUTPATIENT)
Dept: UROLOGY | Facility: CLINIC | Age: 79
End: 2024-09-26
Payer: MEDICARE

## 2024-09-26 VITALS
BODY MASS INDEX: 20.08 KG/M2 | HEART RATE: 91 BPM | DIASTOLIC BLOOD PRESSURE: 63 MMHG | HEIGHT: 68 IN | WEIGHT: 132.5 LBS | SYSTOLIC BLOOD PRESSURE: 150 MMHG

## 2024-09-26 DIAGNOSIS — N28.9 KIDNEY LESION: Primary | ICD-10-CM

## 2024-09-26 PROCEDURE — 99214 OFFICE O/P EST MOD 30 MIN: CPT | Mod: S$GLB,,, | Performed by: UROLOGY

## 2024-09-26 RX ORDER — DULOXETIN HYDROCHLORIDE 60 MG/1
CAPSULE, DELAYED RELEASE ORAL
COMMUNITY
Start: 2024-08-28

## 2024-09-26 NOTE — PROGRESS NOTES
"Subjective:       Patient ID: Yoko Mann is a 79 y.o. female.    Chief Complaint: Post-op Evaluation      HPI:  79-year-old female initially referred for a 2.3 cm upper pole right renal lesion consistent with malignancy.  She recently completed cryoablation and biopsy on 09/12/2024 with [facility].  Pathology. 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    Past Medical History:   Past Medical History:   Diagnosis Date    Depression     Diabetes mellitus        Past Surgical Historical:   Past Surgical History:   Procedure Laterality Date    BACK SURGERY      eyelid      FOOT SURGERY      bilat    HYSTERECTOMY      NECK SURGERY      x2        Medications:   Medication List with Changes/Refills   Current Medications    CLOBETASOL (TEMOVATE) 0.05 % EXTERNAL SOLUTION    APPLY TO THE AFFECTED AREA ON SCALP TWICE DAILY    DROPSAFE ALCOHOL PREP PADS PADM        DULOXETINE (CYMBALTA) 60 MG CAPSULE        DUPIXENT  MG/2 ML PNIJ        EASY TOUCH 32 GAUGE X 5/32" NDLE    use 1 needle SUBCUTANEOUSLY 4 TIMES A DAY    GLIMEPIRIDE (AMARYL) 4 MG TABLET    Take 1 tablet (4 mg total) by mouth 2 (two) times a day.    GLIPIZIDE 5 MG TR24        NOVOLOG FLEXPEN U-100 INSULIN 100 UNIT/ML (3 ML) INPN PEN        TRULICITY 0.75 MG/0.5 ML PEN INJECTOR        TRULICITY 1.5 MG/0.5 ML PEN INJECTOR    INJECT 1.5 MG UNDER THE SKIN ONCE A WEEK        Past Social History:   Social History     Socioeconomic History    Marital status: Single   Tobacco Use    Smoking status: Former     Types: Cigarettes    Smokeless tobacco: Never    Tobacco comments:     quit 35 years ago   Substance and Sexual Activity    Alcohol use: Not Currently    Drug use: Never    Sexual activity: Not Currently     Partners: Male       Allergies:   Review of patient's allergies indicates:   Allergen Reactions    Bee pollens     Ciprofloxacin     Penicillin     Penicillins         Family History:   Family History   Problem Relation Name Age of " Onset    Diabetes Paternal Grandfather      Diabetes Paternal Grandmother      Tuberculosis Maternal Grandmother      Diabetes Father      Cancer Father      Anuerysm Mother      Prostate cancer Brother      Stomach cancer Brother      Stroke Brother          Review of Systems:  Review of Systems   Constitutional:  Negative for activity change and appetite change.   HENT:  Negative for congestion and dental problem.    Respiratory:  Negative for chest tightness and shortness of breath.    Cardiovascular:  Negative for chest pain.   Gastrointestinal:  Negative for abdominal distention and abdominal pain.   Genitourinary:  Negative for decreased urine volume, difficulty urinating, dyspareunia, dysuria, enuresis, flank pain, frequency, genital sores, hematuria, pelvic pain and urgency.   Musculoskeletal:  Negative for back pain and neck pain.   Allergic/Immunologic: Negative for immunocompromised state.   Neurological:  Negative for dizziness.   Hematological:  Negative for adenopathy.   Psychiatric/Behavioral:  Negative for agitation, behavioral problems and confusion.        Physical Exam:  Physical Exam  Constitutional:       Appearance: She is well-developed.   HENT:      Head: Normocephalic and atraumatic.      Right Ear: External ear normal.      Left Ear: External ear normal.   Eyes:      Conjunctiva/sclera: Conjunctivae normal.   Neck:      Vascular: No JVD.   Cardiovascular:      Rate and Rhythm: Normal rate and regular rhythm.   Pulmonary:      Effort: Pulmonary effort is normal. No respiratory distress.      Breath sounds: Normal breath sounds. No wheezing.   Abdominal:      General: There is no distension.      Palpations: Abdomen is soft.      Tenderness: There is no abdominal tenderness. There is no rebound.   Musculoskeletal:         General: Normal range of motion.      Cervical back: Normal range of motion.   Skin:     General: Skin is warm and dry.      Findings: No erythema or rash.   Neurological:       Mental Status: She is alert and oriented to person, place, and time.   Psychiatric:         Behavior: Behavior normal.         Assessment/Plan:     Will have the patient return to clinic in 3 months with follow-up imaging.  Problem List Items Addressed This Visit    None  Visit Diagnoses       Kidney lesion    -  Primary    Relevant Orders    CT Abdomen Pelvis W Wo Contrast

## 2025-01-02 ENCOUNTER — OFFICE VISIT (OUTPATIENT)
Dept: UROLOGY | Facility: CLINIC | Age: 80
End: 2025-01-02
Payer: MEDICARE

## 2025-01-02 VITALS
WEIGHT: 136 LBS | BODY MASS INDEX: 20.61 KG/M2 | HEART RATE: 82 BPM | HEIGHT: 68 IN | DIASTOLIC BLOOD PRESSURE: 65 MMHG | SYSTOLIC BLOOD PRESSURE: 146 MMHG

## 2025-01-02 DIAGNOSIS — N28.9 KIDNEY LESION: Primary | ICD-10-CM

## 2025-01-02 PROCEDURE — 3078F DIAST BP <80 MM HG: CPT | Mod: CPTII,,, | Performed by: UROLOGY

## 2025-01-02 PROCEDURE — 3077F SYST BP >= 140 MM HG: CPT | Mod: CPTII,,, | Performed by: UROLOGY

## 2025-01-02 PROCEDURE — 99214 OFFICE O/P EST MOD 30 MIN: CPT | Mod: S$PBB,,, | Performed by: UROLOGY

## 2025-01-02 PROCEDURE — 3288F FALL RISK ASSESSMENT DOCD: CPT | Mod: CPTII,,, | Performed by: UROLOGY

## 2025-01-02 PROCEDURE — 1159F MED LIST DOCD IN RCRD: CPT | Mod: CPTII,,, | Performed by: UROLOGY

## 2025-01-02 PROCEDURE — 1101F PT FALLS ASSESS-DOCD LE1/YR: CPT | Mod: CPTII,,, | Performed by: UROLOGY

## 2025-01-02 RX ORDER — HYDROCODONE BITARTRATE AND ACETAMINOPHEN 5; 325 MG/1; MG/1
1 TABLET ORAL EVERY 8 HOURS PRN
COMMUNITY
Start: 2024-10-28

## 2025-01-02 RX ORDER — ALBUTEROL SULFATE 90 UG/1
INHALANT RESPIRATORY (INHALATION)
COMMUNITY
Start: 2024-12-18

## 2025-01-02 RX ORDER — MONTELUKAST SODIUM 10 MG/1
10 TABLET ORAL NIGHTLY
COMMUNITY
Start: 2024-12-18

## 2025-01-02 RX ORDER — ALBUTEROL SULFATE 0.83 MG/ML
SOLUTION RESPIRATORY (INHALATION)
COMMUNITY
Start: 2024-12-18

## 2025-01-02 RX ORDER — FLUTICASONE FUROATE, UMECLIDINIUM BROMIDE AND VILANTEROL TRIFENATATE 200; 62.5; 25 UG/1; UG/1; UG/1
POWDER RESPIRATORY (INHALATION)
COMMUNITY
Start: 2024-12-18

## 2025-01-02 NOTE — PROGRESS NOTES
"Subjective:       Patient ID: Yoko Mann is a 79 y.o. female.    Chief Complaint: Kidney lesion      HPI:  79-year-old female status post cryoablation of a renal mass 3 months ago.  Patient is doing well has no complaints on the CT there was some discussion of possible rim enhancement or residual tumor    Past Medical History:   Past Medical History:   Diagnosis Date    Depression     Diabetes mellitus        Past Surgical Historical:   Past Surgical History:   Procedure Laterality Date    BACK SURGERY      eyelid      FOOT SURGERY      bilat    HYSTERECTOMY      NECK SURGERY      x2        Medications:   Medication List with Changes/Refills   Current Medications    ALBUTEROL (PROVENTIL) 2.5 MG /3 ML (0.083 %) NEBULIZER SOLUTION    PLACE 1 AMPULE in NEBULIZER 4 TIMES A DAY FOR WHEEZING & FOR COUGH & SHORTNESS OF BREATH    ALBUTEROL (PROVENTIL/VENTOLIN HFA) 90 MCG/ACTUATION INHALER    INHALE 2 PUFFS EVERY 4 HOURS AS DIRECTED    CLOBETASOL (TEMOVATE) 0.05 % EXTERNAL SOLUTION    APPLY TO THE AFFECTED AREA ON SCALP TWICE DAILY    DROPSAFE ALCOHOL PREP PADS PADM        DULOXETINE (CYMBALTA) 60 MG CAPSULE        DUPIXENT  MG/2 ML PNIJ        EASY TOUCH 32 GAUGE X 5/32" NDLE    use 1 needle SUBCUTANEOUSLY 4 TIMES A DAY    GLIMEPIRIDE (AMARYL) 4 MG TABLET    Take 1 tablet (4 mg total) by mouth 2 (two) times a day.    GLIPIZIDE 5 MG TR24        HYDROCODONE-ACETAMINOPHEN (NORCO) 5-325 MG PER TABLET    Take 1 tablet by mouth every 8 (eight) hours as needed.    MONTELUKAST (SINGULAIR) 10 MG TABLET    Take 10 mg by mouth every evening.    NOVOLOG FLEXPEN U-100 INSULIN 100 UNIT/ML (3 ML) INPN PEN        TRELEGY ELLIPTA 200-62.5-25 MCG INHALER    INHALE 1 PUFF once DAILY AS DIRECTED (rinse mouth after each use)    TRULICITY 0.75 MG/0.5 ML PEN INJECTOR        TRULICITY 1.5 MG/0.5 ML PEN INJECTOR    INJECT 1.5 MG UNDER THE SKIN ONCE A WEEK        Past Social History:   Social History     Socioeconomic History    " Marital status: Single   Tobacco Use    Smoking status: Former     Types: Cigarettes    Smokeless tobacco: Never    Tobacco comments:     quit 35 years ago   Substance and Sexual Activity    Alcohol use: Not Currently    Drug use: Never    Sexual activity: Not Currently     Partners: Male       Allergies:   Review of patient's allergies indicates:   Allergen Reactions    Bee pollens     Ciprofloxacin     Penicillin     Penicillins         Family History:   Family History   Problem Relation Name Age of Onset    Diabetes Paternal Grandfather      Diabetes Paternal Grandmother      Tuberculosis Maternal Grandmother      Diabetes Father      Cancer Father      Anuerysm Mother      Prostate cancer Brother      Stomach cancer Brother      Stroke Brother          Review of Systems:  Review of Systems   Constitutional:  Negative for activity change and appetite change.   HENT:  Negative for congestion and dental problem.    Respiratory:  Negative for chest tightness and shortness of breath.    Cardiovascular:  Negative for chest pain.   Gastrointestinal:  Negative for abdominal distention and abdominal pain.   Genitourinary:  Negative for decreased urine volume, difficulty urinating, dyspareunia, dysuria, enuresis, flank pain, frequency, genital sores, hematuria, pelvic pain and urgency.   Musculoskeletal:  Negative for back pain and neck pain.   Allergic/Immunologic: Negative for immunocompromised state.   Neurological:  Negative for dizziness.   Hematological:  Negative for adenopathy.   Psychiatric/Behavioral:  Negative for agitation, behavioral problems and confusion.        Physical Exam:  Physical Exam  Constitutional:       Appearance: She is well-developed.   HENT:      Head: Normocephalic and atraumatic.      Right Ear: External ear normal.      Left Ear: External ear normal.   Eyes:      Conjunctiva/sclera: Conjunctivae normal.   Neck:      Vascular: No JVD.   Cardiovascular:      Rate and  Rhythm: Normal rate and regular rhythm.   Pulmonary:      Effort: Pulmonary effort is normal. No respiratory distress.      Breath sounds: Normal breath sounds. No wheezing.   Abdominal:      General: There is no distension.      Palpations: Abdomen is soft.      Tenderness: There is no abdominal tenderness. There is no rebound.   Musculoskeletal:         General: Normal range of motion.      Cervical back: Normal range of motion.   Skin:     General: Skin is warm and dry.      Findings: No erythema or rash.   Neurological:      Mental Status: She is alert and oriented to person, place, and time.   Psychiatric:         Behavior: Behavior normal.       Assessment/Plan:       Problem List Items Addressed This Visit    None  Visit Diagnoses       Kidney lesion    -  Primary               79-year-old female status post cryo there was some discussion of residual rim enhancement I have called [facility]ventional Radiology for them to review the imaging and determine whether repeat imaging or repeat cryo would be recommended.

## 2025-02-10 ENCOUNTER — TELEPHONE (OUTPATIENT)
Dept: UROLOGY | Facility: CLINIC | Age: 80
End: 2025-02-10
Payer: MEDICARE

## 2025-02-10 LAB
ABS NRBC COUNT: 0 THOU/UL (ref 0–0.01)
ALBUMIN SERPL BCP-MCNC: 3.8 G/DL (ref 3.4–5)
ALBUMIN/GLOBULIN RATIO: 1.2 RATIO (ref 1.1–1.8)
ALP SERPL-CCNC: 80 U/L (ref 45–117)
ALT SERPL W P-5'-P-CCNC: 25 U/L (ref 13–56)
ANION GAP SERPL CALC-SCNC: 5 MMOL/L (ref 3–11)
APTT PPP: 30.8 SEC (ref 26–38.7)
AST SERPL-CCNC: 23 U/L (ref 15–37)
BILIRUB SERPL-MCNC: 0.4 MG/DL (ref 0.2–1)
BUN SERPL-MCNC: 20 MG/DL (ref 7–18)
BUN/CREAT SERPL: 26.31 RATIO
CALCIUM SERPL-MCNC: 9.6 MG/DL (ref 8.5–10.1)
CHLORIDE SERPL-SCNC: 104 MMOL/L (ref 98–107)
CO2 SERPL-SCNC: 26 MMOL/L (ref 21–32)
CREAT SERPL-MCNC: 0.76 MG/DL (ref 0.55–1.02)
ERYTHROCYTE [DISTWIDTH] IN BLOOD BY AUTOMATED COUNT: 12.2 % (ref 0–15.5)
GFR ESTIMATION: 80
GLOBULIN: 3.1 G/DL (ref 2.3–3.5)
GLUCOSE SERPL-MCNC: 152 MG/DL (ref 74–106)
HCT VFR BLD AUTO: 37.8 % (ref 37–47)
HGB BLD-MCNC: 13 G/DL (ref 12–16)
INR PPP: 1 INR (ref 0.9–1.1)
MCH RBC QN AUTO: 31.4 PG (ref 27–32)
MCHC RBC AUTO-ENTMCNC: 34.4 % (ref 32–36)
MCV RBC AUTO: 91.3 FL (ref 80–99)
NUCLEATED RED BLOOD CELLS: 0 % (ref 0–0.2)
PLATELETS: 268 10*3/UL (ref 130–400)
PMV BLD AUTO: 10.4 FL (ref 9.2–12.2)
POTASSIUM SERPL-SCNC: 4 MMOL/L (ref 3.5–5.1)
PROT SERPL-MCNC: 6.9 G/DL (ref 6.4–8.2)
PROTHROMBIN TIME: 11.8 SEC (ref 10.2–12.9)
RBC # BLD AUTO: 4.14 10*6/UL (ref 4.2–5.4)
SODIUM BLD-SCNC: 135 MMOL/L (ref 131–143)
WBC # BLD: 8.3 10*3/UL (ref 4.5–10)

## 2025-02-10 NOTE — TELEPHONE ENCOUNTER
Spoke with Dr. Castellano's nurse in regards to the pt's labs that were ordered by Dr. Zamora. Per verbalization, Dr. Castellano said to disregard. Informed if they need a copy of the pt's labs to please contact the clinic.       ----- Message from Cristin sent at 2/10/2025 11:08 AM CST -----  Contact: Vivi Reynoso  ..Type:  Patient Requesting Call    Who Called: Vivi Reynoso  What is the call regarding?: wants to know what lad you are scheduling for pt because the doctor also has some they want to schedule.  Would the patient rather a call back or a response via [facility]?  call  Best Call Back Number: [phone]  Additional Information:

## 2025-02-11 ENCOUNTER — TELEPHONE (OUTPATIENT)
Dept: HEMATOLOGY/ONCOLOGY | Facility: CLINIC | Age: 80
End: 2025-02-11
Payer: MEDICARE

## 2025-02-11 NOTE — TELEPHONE ENCOUNTER
Called the patient regarding referral. No answer. Left a VM with my direct number requesting a call back to discuss referral and schedule new patient visit. TTRN

## 2025-02-12 ENCOUNTER — TELEPHONE (OUTPATIENT)
Dept: HEMATOLOGY/ONCOLOGY | Facility: CLINIC | Age: 80
End: 2025-02-12
Payer: MEDICARE

## 2025-02-12 NOTE — TELEPHONE ENCOUNTER
"Spoke to the patient regarding referral. Patient was aware of referral and reason. Attempted to schedule patient on 2/12/25 or 2/14/25. Patient states she would liek an appointment "later next week" due to her cryoablation scheduled for 2/12/25 and "not knowing how she was going to feel after". Appointment scheduled per patient request on the date and time requested. Location provided. Appointment also placed in mail today. TTRN  "

## 2025-02-13 LAB
GLUCOSE SERPL-MCNC: 121 MG/DL (ref 70–105)
GLUCOSE SERPL-MCNC: 165 MG/DL (ref 70–105)

## 2025-02-20 ENCOUNTER — OFFICE VISIT (OUTPATIENT)
Dept: HEMATOLOGY/ONCOLOGY | Facility: CLINIC | Age: 80
End: 2025-02-20
Payer: MEDICARE

## 2025-02-20 VITALS
WEIGHT: 135.5 LBS | RESPIRATION RATE: 16 BRPM | HEIGHT: 68 IN | HEART RATE: 80 BPM | SYSTOLIC BLOOD PRESSURE: 151 MMHG | OXYGEN SATURATION: 96 % | BODY MASS INDEX: 20.54 KG/M2 | DIASTOLIC BLOOD PRESSURE: 70 MMHG

## 2025-02-20 DIAGNOSIS — C64.9 RENAL CELL CARCINOMA, UNSPECIFIED LATERALITY: ICD-10-CM

## 2025-02-20 DIAGNOSIS — C64.1 MALIGNANT NEOPLASM OF RIGHT KIDNEY, EXCEPT RENAL PELVIS: Primary | ICD-10-CM

## 2025-02-20 LAB
ALBUMIN SERPL BCP-MCNC: 4.3 G/DL (ref 3.4–5)
ALBUMIN/GLOBULIN RATIO: 1.26 RATIO (ref 1.1–1.8)
ALP SERPL-CCNC: 91 U/L (ref 46–116)
ALT SERPL W P-5'-P-CCNC: 25 U/L (ref 12–78)
ANION GAP SERPL CALC-SCNC: 10 MMOL/L (ref 3–11)
AST SERPL-CCNC: 17 U/L (ref 15–37)
BASOPHILS NFR BLD: 1 % (ref 0–3)
BILIRUB SERPL-MCNC: 0.4 MG/DL (ref 0–1)
BUN SERPL-MCNC: 14 MG/DL (ref 7–18)
BUN/CREAT SERPL: 18.66 RATIO (ref 7–18)
CALCIUM SERPL-MCNC: 9.8 MG/DL (ref 8.8–10.5)
CHLORIDE SERPL-SCNC: 101 MMOL/L (ref 100–108)
CO2 SERPL-SCNC: 28 MMOL/L (ref 21–32)
CREAT SERPL-MCNC: 0.75 MG/DL (ref 0.55–1.02)
EOSINOPHIL NFR BLD: 1 % (ref 1–3)
ERYTHROCYTE [DISTWIDTH] IN BLOOD BY AUTOMATED COUNT: 12.2 % (ref 12.5–18)
GFR ESTIMATION: 81
GLOBULIN: 3.4 G/DL (ref 2.3–3.5)
GLUCOSE SERPL-MCNC: 213 MG/DL (ref 70–110)
HCT VFR BLD AUTO: 41.1 % (ref 37–47)
HGB BLD-MCNC: 13.6 G/DL (ref 12–16)
LDH SERPL L TO P-CCNC: 204 U/L (ref 82–234)
LYMPHOCYTES NFR BLD: 23.6 % (ref 25–40)
MCH RBC QN AUTO: 30.4 PG (ref 27–31.2)
MCHC RBC AUTO-ENTMCNC: 33.1 G/DL (ref 31.8–35.4)
MCV RBC AUTO: 91.7 FL (ref 80–97)
MONOCYTES NFR BLD: 10 % (ref 1–15)
NEUTROPHILS # BLD AUTO: 4.5 10*3/UL (ref 1.8–7.7)
NEUTROPHILS NFR BLD: 64 % (ref 37–80)
NUCLEATED RED BLOOD CELLS: 0 %
PLATELETS: 325 10*3/UL (ref 142–424)
POTASSIUM SERPL-SCNC: 4.3 MMOL/L (ref 3.6–5.2)
PROT SERPL-MCNC: 7.7 G/DL (ref 6.4–8.2)
RBC # BLD AUTO: 4.48 10*6/UL (ref 4.2–5.4)
SODIUM BLD-SCNC: 139 MMOL/L (ref 135–145)
WBC # BLD: 7 10*3/UL (ref 4.6–10.2)

## 2025-02-20 PROCEDURE — 99205 OFFICE O/P NEW HI 60 MIN: CPT | Mod: S$PBB,,, | Performed by: INTERNAL MEDICINE

## 2025-02-20 PROCEDURE — 1125F AMNT PAIN NOTED PAIN PRSNT: CPT | Mod: CPTII,,, | Performed by: INTERNAL MEDICINE

## 2025-02-20 PROCEDURE — 1101F PT FALLS ASSESS-DOCD LE1/YR: CPT | Mod: CPTII,,, | Performed by: INTERNAL MEDICINE

## 2025-02-20 PROCEDURE — 3078F DIAST BP <80 MM HG: CPT | Mod: CPTII,,, | Performed by: INTERNAL MEDICINE

## 2025-02-20 PROCEDURE — 1159F MED LIST DOCD IN RCRD: CPT | Mod: CPTII,,, | Performed by: INTERNAL MEDICINE

## 2025-02-20 PROCEDURE — G2211 COMPLEX E/M VISIT ADD ON: HCPCS | Mod: S$PBB,,, | Performed by: INTERNAL MEDICINE

## 2025-02-20 PROCEDURE — 1160F RVW MEDS BY RX/DR IN RCRD: CPT | Mod: CPTII,,, | Performed by: INTERNAL MEDICINE

## 2025-02-20 PROCEDURE — 3077F SYST BP >= 140 MM HG: CPT | Mod: CPTII,,, | Performed by: INTERNAL MEDICINE

## 2025-02-20 PROCEDURE — 1158F ADVNC CARE PLAN TLK DOCD: CPT | Mod: CPTII,,, | Performed by: INTERNAL MEDICINE

## 2025-02-20 PROCEDURE — 3288F FALL RISK ASSESSMENT DOCD: CPT | Mod: CPTII,,, | Performed by: INTERNAL MEDICINE

## 2025-02-20 PROCEDURE — 99497 ADVNCD CARE PLAN 30 MIN: CPT | Mod: S$PBB,,, | Performed by: INTERNAL MEDICINE

## 2025-02-20 RX ORDER — EZETIMIBE 10 MG/1
10 TABLET ORAL DAILY
COMMUNITY

## 2025-02-20 RX ORDER — DONEPEZIL HYDROCHLORIDE 5 MG/1
5 TABLET, ORALLY DISINTEGRATING ORAL NIGHTLY
COMMUNITY

## 2025-02-20 RX ORDER — DAPAGLIFLOZIN 10 MG/1
10 TABLET, FILM COATED ORAL DAILY
COMMUNITY

## 2025-02-20 NOTE — PROGRESS NOTES
MEDICAL ONCOLOGY INITIAL CONSULTATION NOTE    Patient ID: Ykoo Mann is a 79 y.o. female.    Chief Complaint: Renal cell carcinoma    HPI: Patient is a 79 year old female with PMH of DM, depression, arthritis, back pain, HLD, Diabetic neuropathy with recent diagnosis of renal cell carcinoma rising from the R kidney. Patient also was noted to have a lung nodule on imaging done few months back. Presents to our clinic today for further evaluation. 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   2. Normal appearance of left kidney.   3. Tiny left lower lobe pulmonary nodule. Recommend surveillance.   4. Abnormal marrow pattern as can be seen with hyperparathyroidism and Paget's disease. Additional findings above.                   Past Medical History:   Diagnosis Date    Asthma     Depression     Diabetes mellitus      Family History   Problem Relation Name Age of Onset    Anuerysm Mother      Diabetes Father      Cancer Father      Prostate cancer Brother      Stomach cancer Brother      Stroke Brother      Tuberculosis Maternal Grandmother      Diabetes Paternal Grandmother      Diabetes Paternal Grandfather       Social History[1]  Past Surgical History:   Procedure Laterality Date    BACK SURGERY      CRYOABLATION Right     kidney    eyelid      FOOT SURGERY      bilat    HYSTERECTOMY      NECK SURGERY      x2         Review of systems:  Review of Systems   Constitutional:  Negative for activity change, appetite change, chills, diaphoresis, fatigue and unexpected weight change.   HENT:  Negative for congestion, facial swelling, hearing loss, mouth sores, trouble swallowing and voice change.    Eyes:  Negative for photophobia, pain, discharge and itching.   Respiratory:  Negative for apnea, cough,  choking, chest tightness and shortness of breath.    Cardiovascular:  Negative for chest pain, palpitations and leg swelling.   Gastrointestinal:  Negative for abdominal distention, abdominal pain, anal bleeding and blood in stool.   Endocrine: Negative for cold intolerance, heat intolerance, polydipsia and polyphagia.   Genitourinary:  Negative for difficulty urinating, dysuria, flank pain and hematuria.   Musculoskeletal:  Negative for arthralgias, back pain, joint swelling, myalgias, neck pain and neck stiffness.   Skin:  Negative for color change, pallor and wound.   Allergic/Immunologic: Negative for environmental allergies, food allergies and immunocompromised state.   Neurological:  Negative for dizziness, seizures, facial asymmetry, speech difficulty, light-headedness, numbness and headaches.   Hematological:  Negative for adenopathy. Does not bruise/bleed easily.   Psychiatric/Behavioral:  Negative for agitation, behavioral problems, confusion, decreased concentration and sleep disturbance.                          Physical Exam  Vitals and nursing note reviewed.   Constitutional:       General: She is not in acute distress.     Appearance: Normal appearance. She is not ill-appearing.   HENT:      Head: Normocephalic and atraumatic.      Nose: No congestion or rhinorrhea.   Eyes:      General: No scleral icterus.     Extraocular Movements: Extraocular movements intact.      Pupils: Pupils are equal, round, and reactive to light.   Cardiovascular:      Rate and Rhythm: Normal rate and regular rhythm.      Pulses: Normal pulses.      Heart sounds: Normal heart sounds. No murmur heard.     No gallop.   Pulmonary:      Effort: Pulmonary effort is normal. No respiratory distress.      Breath sounds: Normal breath sounds. No stridor. No wheezing or rhonchi.   Abdominal:      General: Bowel sounds are normal. There is no distension.      Palpations: There is no mass.      Tenderness: There is no abdominal  tenderness. There is no guarding.   Musculoskeletal:         General: No swelling, tenderness, deformity or signs of injury. Normal range of motion.      Cervical back: Normal range of motion and neck supple. No rigidity. No muscular tenderness.      Right lower leg: No edema.      Left lower leg: No edema.   Skin:     General: Skin is warm.      Coloration: Skin is not jaundiced or pale.      Findings: No bruising or lesion.   Neurological:      General: No focal deficit present.      Mental Status: She is alert and oriented to person, place, and time.      Cranial Nerves: No cranial nerve deficit.      Sensory: No sensory deficit.      Motor: No weakness.      Gait: Gait normal.   Psychiatric:         Mood and Affect: Mood normal.         Behavior: Behavior normal.         Thought Content: Thought content normal.       Vitals:    02/20/25 1008   BP: (!) 151/70   Pulse: 80   Resp: 16   Body surface area is 1.72 meters squared.    Assessment/Plan:      ECOG 1    Clear renal cell carcinoma arising from the right kidney:    == Grade 1  == Status post needle biopsy from the right kidney in September of 2024.  Status post cryoablation by Urology followed by repeat Cryo ablation February 12, 2025  == Patient was also noted to have lung nodule on imaging done few months back.  Discussed with patient in detail future management options for clear renal cell carcinoma.  Patient is a status post cryoablation for clear renal cell carcinoma arising from right kidney.  I will obtain PET scan for restaging and to evaluate the lung nodule noted on prior imaging few months back        Plan:  PET scan for restaging status post repeat cryoablation on the right kidney      Future Appointments   Date Time Provider Department Center   3/20/2025  9:40 AM Marcos Kirby MD [facility] [address]   4/8/2025  1:30 PM Samuel Zamora MD [facility] [address]               Advance Care Planning     Date: 02/20/2025    Power of    I initiated the process of voluntary advance care planning today and explained the importance of this process to the patient.  I introduced the concept of advance directives to the patient, as well. Then the patient received detailed information about the importance of designating a Health Care Power of  (HCPOA). She was also instructed to communicate with this person about their wishes for future healthcare, should she become sick and lose decision-making capacity. The patient has not previously appointed a HCPOA. After our discussion, the patient has decided to complete a HCPOA and has appointed her    , health care agent:  Please see scanned on chart. I encouraged her to communicate with this person about their wishes for future healthcare, should she become sick and lose decision-making capacity.      A total of 20 min was spent on advance care planning, goals of care discussion, emotional support, formulating and communicating prognosis and exploring burden/benefit of various approaches of treatment. This discussion occurred on a fully voluntary basis with the verbal consent of the patient and/or family.           Total time spent in counseling and discussion about further management options including relevant lab work, treatment,  prognosis, medications and intended side effects was more than 60 minutes. More than 50% of the time was spent on counseling and coordination of care.  I spent a total of 60 minutes on the day of the visit.This includes face to face time and non-face to face time preparing to see the patient (eg, review of tests), Obtaining and/or reviewing separately obtained history, Documenting clinical information in the electronic or other health record, Independently interpreting resultsand communicating results to the patient/family/caregiver, or Care coordination.            [1]   Social History  Socioeconomic History    Marital status: Single   Tobacco Use    Smoking  status: Former     Types: Cigarettes    Smokeless tobacco: Never    Tobacco comments:     quit 35 years ago   Substance and Sexual Activity    Alcohol use: Not Currently    Drug use: Never    Sexual activity: Not Currently     Partners: Male

## 2025-02-24 ENCOUNTER — OUTSIDE PLACE OF SERVICE (OUTPATIENT)
Dept: INTERVENTIONAL RADIOLOGY/VASCULAR | Facility: CLINIC | Age: 80
End: 2025-02-24
Payer: MEDICARE

## 2025-03-17 ENCOUNTER — PATIENT MESSAGE (OUTPATIENT)
Dept: HEMATOLOGY/ONCOLOGY | Facility: CLINIC | Age: 80
End: 2025-03-17
Payer: MEDICARE

## 2025-03-20 ENCOUNTER — OFFICE VISIT (OUTPATIENT)
Dept: HEMATOLOGY/ONCOLOGY | Facility: CLINIC | Age: 80
End: 2025-03-20
Payer: MEDICARE

## 2025-03-20 VITALS
OXYGEN SATURATION: 96 % | HEIGHT: 68 IN | HEART RATE: 80 BPM | BODY MASS INDEX: 20.35 KG/M2 | DIASTOLIC BLOOD PRESSURE: 70 MMHG | WEIGHT: 134.31 LBS | RESPIRATION RATE: 16 BRPM | SYSTOLIC BLOOD PRESSURE: 159 MMHG

## 2025-03-20 DIAGNOSIS — Z71.9 ENCOUNTER FOR EDUCATION: ICD-10-CM

## 2025-03-20 DIAGNOSIS — C64.1 MALIGNANT NEOPLASM OF RIGHT KIDNEY, EXCEPT RENAL PELVIS: Primary | ICD-10-CM

## 2025-03-20 DIAGNOSIS — Z15.89 MONOALLELIC MUTATION OF VHL GENE: ICD-10-CM

## 2025-03-20 PROCEDURE — 3078F DIAST BP <80 MM HG: CPT | Mod: CPTII,,, | Performed by: NURSE PRACTITIONER

## 2025-03-20 PROCEDURE — 3077F SYST BP >= 140 MM HG: CPT | Mod: CPTII,,, | Performed by: NURSE PRACTITIONER

## 2025-03-20 PROCEDURE — 1159F MED LIST DOCD IN RCRD: CPT | Mod: CPTII,,, | Performed by: NURSE PRACTITIONER

## 2025-03-20 PROCEDURE — G2211 COMPLEX E/M VISIT ADD ON: HCPCS | Mod: S$PBB,,, | Performed by: NURSE PRACTITIONER

## 2025-03-20 PROCEDURE — 99215 OFFICE O/P EST HI 40 MIN: CPT | Mod: S$PBB,,, | Performed by: NURSE PRACTITIONER

## 2025-03-20 RX ORDER — OXYCODONE AND ACETAMINOPHEN 5; 325 MG/1; MG/1
1 TABLET ORAL EVERY 6 HOURS PRN
COMMUNITY
Start: 2025-02-13

## 2025-03-20 RX ORDER — DULOXETIN HYDROCHLORIDE 30 MG/1
30 CAPSULE, DELAYED RELEASE ORAL DAILY
COMMUNITY
Start: 2025-02-10

## 2025-03-20 RX ORDER — INSULIN GLARGINE 100 [IU]/ML
40 INJECTION, SOLUTION SUBCUTANEOUS DAILY
COMMUNITY
Start: 2025-03-18

## 2025-03-20 NOTE — PROGRESS NOTES
MEDICAL ONCOLOGY INITIAL CONSULTATION NOTE    Patient ID: Yoko Mann is a 79 y.o. female.    Chief Complaint: Renal cell carcinoma    HPI: Patient is a 79 year old female with PMH of DM, depression, arthritis, back pain, HLD, Diabetic neuropathy with recent diagnosis of renal cell carcinoma rising from the R kidney. Patient also was noted to have a lung nodule on imaging done few months back. Presents to our clinic today for further evaluation. 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 Persistent focal asymmetric density in the lower inner aspect of the right breast corresponds to a    0.7 cm irregularly marginated hypoechoic solid mass at the 3 o'clock position 3 cm from the nipple o   n today's ultrasound. Further evaluation with ultrasound-guided core biopsy is recommended.   2. Thickened right axillary lymph node with cortex up to 0.6 cm. Further evaluation with ultrasound-g   uided core biopsy is recommended.   3. Probably benign complicated cyst in the area of palpable concern at the 2 o'clock position of the   left breast 11 cm from the nipple. Follow-up left breast ultrasound in 6 months is recommended to ens   ure ongoing stability of benign features.     BI-RADS: 4: Suspicious               Past Medical History:   Diagnosis Date    Asthma     Depression     Diabetes mellitus      Family History   Problem Relation Name Age of Onset    Anuerysm Mother      Diabetes Father      Cancer Father      Prostate cancer Brother      Stomach cancer Brother      Stroke Brother      Tuberculosis Maternal Grandmother      Diabetes Paternal Grandmother      Diabetes Paternal Grandfather       Social History[1]  Past Surgical History:   Procedure Laterality Date    BACK SURGERY      CRYOABLATION Right     kidney    eyelid      FOOT SURGERY      bilat     HYSTERECTOMY      NECK SURGERY      x2         Review of systems:  Review of Systems   Constitutional:  Negative for activity change, appetite change, chills, diaphoresis, fatigue and unexpected weight change.   HENT:  Negative for congestion, facial swelling, hearing loss, mouth sores, trouble swallowing and voice change.    Eyes:  Negative for photophobia, pain, discharge and itching.   Respiratory:  Negative for apnea, cough, choking, chest tightness and shortness of breath.    Cardiovascular:  Negative for chest pain, palpitations and leg swelling.   Gastrointestinal:  Negative for abdominal distention, abdominal pain, anal bleeding and blood in stool.   Endocrine: Negative for cold intolerance, heat intolerance, polydipsia and polyphagia.   Genitourinary:  Negative for difficulty urinating, dysuria, flank pain and hematuria.   Musculoskeletal:  Negative for arthralgias, back pain, joint swelling, myalgias, neck pain and neck stiffness.   Skin:  Negative for color change, pallor and wound.   Allergic/Immunologic: Negative for environmental allergies, food allergies and immunocompromised state.   Neurological:  Negative for dizziness, seizures, facial asymmetry, speech difficulty, light-headedness, numbness and headaches.   Hematological:  Negative for adenopathy. Does not bruise/bleed easily.   Psychiatric/Behavioral:  Negative for agitation, behavioral problems, confusion, decreased concentration and sleep disturbance.                          Physical Exam  Vitals and nursing note reviewed.   Constitutional:       General: She is not in acute distress.     Appearance: Normal appearance. She is not ill-appearing.   HENT:      Head: Normocephalic and atraumatic.      Nose: No congestion or rhinorrhea.   Eyes:      General: No scleral icterus.     Extraocular Movements: Extraocular movements intact.      Pupils: Pupils are equal, round, and reactive to light.   Cardiovascular:      Rate and Rhythm: Normal rate  and regular rhythm.      Pulses: Normal pulses.      Heart sounds: Normal heart sounds. No murmur heard.     No gallop.   Pulmonary:      Effort: Pulmonary effort is normal. No respiratory distress.      Breath sounds: Normal breath sounds. No stridor. No wheezing or rhonchi.   Abdominal:      General: Bowel sounds are normal. There is no distension.      Palpations: There is no mass.      Tenderness: There is no abdominal tenderness. There is no guarding.   Musculoskeletal:         General: No swelling, tenderness, deformity or signs of injury. Normal range of motion.      Cervical back: Normal range of motion and neck supple. No rigidity. No muscular tenderness.      Right lower leg: No edema.      Left lower leg: No edema.   Skin:     General: Skin is warm.      Coloration: Skin is not jaundiced or pale.      Findings: No bruising or lesion.   Neurological:      General: No focal deficit present.      Mental Status: She is alert and oriented to person, place, and time.      Cranial Nerves: No cranial nerve deficit.      Sensory: No sensory deficit.      Motor: No weakness.      Gait: Gait normal.   Psychiatric:         Mood and Affect: Mood normal.         Behavior: Behavior normal.         Thought Content: Thought content normal.       Vitals:    03/20/25 1049   BP: (!) 159/70   Pulse: 80   Resp: 16     Body surface area is 1.71 meters squared.    Assessment/Plan:      ECOG 1    Clear renal cell carcinoma arising from the right kidney:    == Grade 1  == Status post needle biopsy from the right kidney in September of 2024.  Status post cryoablation by Urology followed by repeat Cryo ablation February 12, 2025  == Patient was also noted to have lung nodule on imaging done few months back.  Discussed with patient in detail future management options for clear renal cell carcinoma.  Patient is a status post cryoablation for clear renal cell carcinoma arising from right kidney.  I will obtain PET scan for restaging  and to evaluate the lung nodule noted on prior imaging few months back  3/20/25: Pet/Ct on 3/11/25 shows mildly hypermetabolic asymmetric soft tissue in the right breast with mildly prominent and and hypermetabolic right axillary lymph nodes, to right paratracheal and anterior hilar nodes. Breast malignancy cannot be excluded. Right axillary and mediastinal nodes, nonspecific.     2. Right Breast lesion and axillary Bx negative on 2/26/25  focal asymmetric density in the lower inner aspect of the right breast corresponds to a  0.7 cm irregularly marginated hypoechoic solid mass at the 3 o'clock position 3 cm from the nipple on today's ultrasound. Further evaluation with ultrasound-guided core biopsy is recommended. Thickened right axillary lymph node with cortex up to 0.6 cm.     3. 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  Patients with VHL have a high risk for renal cancer, which is often diagnosed at young ages. VHL related kidney cancers are usually clear cell carcinomas. They are usually multi-focal and bilateral (affecting both kidneys).]  High risk for pancreatic neuroendocrine tumors (pancreatic NET) and paragangliomas (tumors that form from nerve tissue in the head, neck, or abdomen). These often occur at young ages. The majority of paragangliomas seen in VHL patients occur in the adrenal glands and are called pheochromocytomas. These tumors can secrete hormones that cause symptoms such as high blood pressure, rapid and/or abnormal heartbeat, headaches, sweating, nausea, fatigue and psychological upset.  VHL can be classified into one of five types (Type 1, Type 1B, Type 2A, Type 2B, and Type 2C). These classifications are based on the most common kinds of tumors seen in patients and the type of VHL gene mutation. Although there may be differences in the risk for various tumors and other features  of VHL based on these classifications, at the current time there are no differences in the medical management recommendations based on the type of VHL gene mutation.  Patients with VHL often have a wide-variety of other non-malignant, but potentially serious, features of the condition. These include hemangioblastomas of the central nervous system and the eye, endolymphatic sac tumors in the ears (which can lead to hearing loss), visceral cysts (especially in the kidneys, pancreas, and liver), and papillary cystadenomas of the broad ligament and epididymis (which can cause infertility in both men and women). These non-malignant features are often the first noticeable symptoms of VHL and can be life-threatening.  While individuals with VHL have a high risk of cancers and other serious health concerns, these can be greatly reduced with appropriate medical management. Guidelines from the Scientific Advisory Board of the VHL [place], National Comprehensive Cancer Network (NCCN), and the American Association for Cancer Research (AACR) are listed below. Since VHL is a complicated disease affecting a wide variety of organs, it is recommended that individuals with VHL be managed by a multidisciplinary team with expertise in medical genetics and the care of patients with this condition.    Approximately 20% of individuals with VHL have not inherited the VHL mutation from a parent. In these cases, the mutation has developed spontaneously in that individual (a de will mutation). Once this occurs, the children of that individual are each at 50% risk of inheriting that mutation.     Will send for [facility] genetics testing for germline testing     Plan:  [facility] genetic testing provided today, consents signed  RTC in 3- 4 weeks for results  Will repeat PET/CT in 60-90 days to ensure stability    Future Appointments   Date Time Provider Department Center   4/8/2025  1:30 PM Samuel Zamora MD [facility]  [address]                Advance Care Planning     Date: 02/20/2025    Power of   I initiated the process of voluntary advance care planning today and explained the importance of this process to the patient.  I introduced the concept of advance directives to the patient, as well. Then the patient received detailed information about the importance of designating a Health Care Power of  (HCPOA). She was also instructed to communicate with this person about their wishes for future healthcare, should she become sick and lose decision-making capacity. The patient has not previously appointed a HCPOA. After our discussion, the patient has decided to complete a HCPOA and has appointed her    , health care agent:  Please see scanned on chart. I encouraged her to communicate with this person about their wishes for future healthcare, should she become sick and lose decision-making capacity.      A total of 20 min was spent on advance care planning, goals of care discussion, emotional support, formulating and communicating prognosis and exploring burden/benefit of various approaches of treatment. This discussion occurred on a fully voluntary basis with the verbal consent of the patient and/or family.           Total time spent in counseling and discussion about further management options including relevant lab work, treatment,  prognosis, medications and intended side effects was more than 60 minutes. More than 50% of the time was spent on counseling and coordination of care.  I spent a total of 60 minutes on the day of the visit.This includes face to face time and non-face to face time preparing to see the patient (eg, review of tests), Obtaining and/or reviewing separately obtained history, Documenting clinical information in the electronic or other health record, Independently interpreting resultsand communicating results to the patient/family/caregiver, or Care coordination.              [1]   Social  History  Socioeconomic History    Marital status: Single   Tobacco Use    Smoking status: Former     Types: Cigarettes    Smokeless tobacco: Never    Tobacco comments:     quit 35 years ago   Substance and Sexual Activity    Alcohol use: Not Currently    Drug use: Never    Sexual activity: Not Currently     Partners: Male

## 2025-04-04 ENCOUNTER — RESULTS FOLLOW-UP (OUTPATIENT)
Dept: UROLOGY | Facility: CLINIC | Age: 80
End: 2025-04-04

## 2025-04-08 ENCOUNTER — TELEPHONE (OUTPATIENT)
Dept: UROLOGY | Facility: CLINIC | Age: 80
End: 2025-04-08

## 2025-04-08 NOTE — TELEPHONE ENCOUNTER
Attempted to return call to pt, left VM. [facility] canceled for today due to pt being unable to make it.     ----- Message from Yoko sent at 4/8/2025 12:49 PM CDT -----  Regarding: reschedule appt  Contact: pt  Pt calling about appt for today thought appt was thrusday so will not be able to make appt because she live in [place].  Next available appt not showing any and she can be reached at [phone],

## 2025-04-11 ENCOUNTER — TELEPHONE (OUTPATIENT)
Dept: UROLOGY | Facility: CLINIC | Age: 80
End: 2025-04-11
Payer: MEDICARE

## 2025-04-11 NOTE — TELEPHONE ENCOUNTER
Attempted to return request for call back in regards to getting appointment to discuss test results  & the plan of care moving forward. No answer, left detailed message informing that appointment with Dr. Zamora was made for Tuesday, April 15, 2025 at 10:40 AM & our number to call with any questions or concerns.                       ----- Message from Cristin sent at 4/11/2025 11:48 AM CDT -----  Contact: BINDU VILLALOBOS [[id number]]  ..Type:  Patient Requesting CallWho Called: BINDU VILLALOBOS [[id number]]What is the call regarding?: pt is calling back about results of her test and to schedule appt to figure out what the plan is for her kidney cancerWould the patient rather a call back or a response via [facility]?  call[facility] Call Back Number: [phone] (home) Additional Information:

## 2025-04-15 ENCOUNTER — OFFICE VISIT (OUTPATIENT)
Dept: UROLOGY | Facility: CLINIC | Age: 80
End: 2025-04-15
Payer: MEDICARE

## 2025-04-15 VITALS
WEIGHT: 134 LBS | BODY MASS INDEX: 20.31 KG/M2 | DIASTOLIC BLOOD PRESSURE: 63 MMHG | HEIGHT: 68 IN | SYSTOLIC BLOOD PRESSURE: 135 MMHG | HEART RATE: 83 BPM

## 2025-04-15 DIAGNOSIS — N28.9 KIDNEY LESION: Primary | ICD-10-CM

## 2025-04-15 PROCEDURE — 3078F DIAST BP <80 MM HG: CPT | Mod: CPTII,,, | Performed by: UROLOGY

## 2025-04-15 PROCEDURE — 3288F FALL RISK ASSESSMENT DOCD: CPT | Mod: CPTII,,, | Performed by: UROLOGY

## 2025-04-15 PROCEDURE — 1159F MED LIST DOCD IN RCRD: CPT | Mod: CPTII,,, | Performed by: UROLOGY

## 2025-04-15 PROCEDURE — 1101F PT FALLS ASSESS-DOCD LE1/YR: CPT | Mod: CPTII,,, | Performed by: UROLOGY

## 2025-04-15 PROCEDURE — 99214 OFFICE O/P EST MOD 30 MIN: CPT | Mod: S$PBB,,, | Performed by: UROLOGY

## 2025-04-15 PROCEDURE — 3075F SYST BP GE 130 - 139MM HG: CPT | Mod: CPTII,,, | Performed by: UROLOGY

## 2025-04-15 RX ORDER — DONEPEZIL HYDROCHLORIDE 5 MG/1
TABLET, FILM COATED ORAL
COMMUNITY
Start: 2025-04-10

## 2025-04-15 RX ORDER — ONDANSETRON 8 MG/1
TABLET, ORALLY DISINTEGRATING ORAL
COMMUNITY
Start: 2025-03-27

## 2025-04-15 NOTE — PROGRESS NOTES
"Subjective:       Patient ID: Yoko Mann is a 79 y.o. female.    Chief Complaint: No chief complaint on file.      HPI:  79-year-old female with a history of cryoablation x2 to the same lesion she is here for her follow-up CT imaging    Past Medical History:   Past Medical History:   Diagnosis Date    Asthma     Depression     Diabetes mellitus        Past Surgical Historical:   Past Surgical History:   Procedure Laterality Date    BACK SURGERY      CRYOABLATION Right     kidney    eyelid      FOOT SURGERY      bilat    HYSTERECTOMY      NECK SURGERY      x2        Medications:   Medication List with Changes/Refills   Current Medications    ALBUTEROL (PROVENTIL) 2.5 MG /3 ML (0.083 %) NEBULIZER SOLUTION    PLACE 1 AMPULE in NEBULIZER 4 TIMES A DAY FOR WHEEZING & FOR COUGH & SHORTNESS OF BREATH    ALBUTEROL (PROVENTIL/VENTOLIN HFA) 90 MCG/ACTUATION INHALER    INHALE 2 PUFFS EVERY 4 HOURS AS DIRECTED    BASAGLAR KWIKPEN U-100 INSULIN 100 UNIT/ML (3 ML) INPN PEN    Inject 40 Units into the skin once daily.    CLOBETASOL (TEMOVATE) 0.05 % EXTERNAL SOLUTION    APPLY TO THE AFFECTED AREA ON SCALP TWICE DAILY    DAPAGLIFLOZIN PROPANEDIOL (FARXIGA) 10 MG TABLET    Take 10 mg by mouth once daily.    DONEPEZIL (ARICEPT ODT) 5 MG TBDL    Take 5 mg by mouth nightly.    DONEPEZIL (ARICEPT) 5 MG TABLET        DROPSAFE ALCOHOL PREP PADS PADM        DULOXETINE (CYMBALTA) 30 MG CAPSULE    Take 30 mg by mouth once daily.    DULOXETINE (CYMBALTA) 60 MG CAPSULE        DUPIXENT  MG/2 ML PNIJ        EASY TOUCH 32 GAUGE X 5/32" NDLE    use 1 needle SUBCUTANEOUSLY 4 TIMES A DAY    EZETIMIBE (ZETIA) 10 MG TABLET    Take 10 mg by mouth once daily.    GLIMEPIRIDE (AMARYL) 4 MG TABLET    Take 1 tablet (4 mg total) by mouth 2 (two) times a day.    GLIPIZIDE 5 MG TR24        HYDROCODONE-ACETAMINOPHEN (NORCO) 5-325 MG PER TABLET    Take 1 tablet by mouth every 8 (eight) hours as needed.    MONTELUKAST (SINGULAIR) 10 MG TABLET    Take 10 " mg by mouth every evening.    NOVOLOG FLEXPEN U-100 INSULIN 100 UNIT/ML (3 ML) INPN PEN        ONDANSETRON (ZOFRAN-ODT) 8 MG TBDL        OXYCODONE-ACETAMINOPHEN (PERCOCET) 5-325 MG PER TABLET    Take 1 tablet by mouth every 6 (six) hours as needed.    TRELEGY ELLIPTA 200-62.5-25 MCG INHALER    INHALE 1 PUFF once DAILY AS DIRECTED (rinse mouth after each use)    TRULICITY 0.75 MG/0.5 ML PEN INJECTOR        TRULICITY 1.5 MG/0.5 ML PEN INJECTOR            Past Social History: Social History[1]    Allergies:   Review of patient's allergies indicates:   Allergen Reactions    Bee pollens     Ciprofloxacin     Penicillin     Penicillins         Family History:   Family History   Problem Relation Name Age of Onset    Anuerysm Mother      Diabetes Father      Cancer Father      Prostate cancer Brother      Stomach cancer Brother      Stroke Brother      Tuberculosis Maternal Grandmother      Diabetes Paternal Grandmother      Diabetes Paternal Grandfather          Review of Systems:  Review of Systems   Constitutional:  Negative for activity change and appetite change.   HENT:  Negative for congestion and dental problem.    Respiratory:  Negative for chest tightness and shortness of breath.    Cardiovascular:  Negative for chest pain.   Gastrointestinal:  Negative for abdominal distention and abdominal pain.   Genitourinary:  Negative for decreased urine volume, difficulty urinating, dyspareunia, dysuria, enuresis, flank pain, frequency, genital sores, hematuria, pelvic pain and urgency.   Musculoskeletal:  Negative for back pain and neck pain.   Allergic/Immunologic: Negative for immunocompromised state.   Neurological:  Negative for dizziness.   Hematological:  Negative for adenopathy.   Psychiatric/Behavioral:  Negative for agitation, behavioral problems and confusion.        Physical Exam:  Physical Exam    Assessment/Plan:       Problem List Items Addressed This Visit    None  Visit Diagnoses         Kidney lesion    -   Primary    Relevant Orders    CT Abdomen Pelvis W Wo Contrast    Creatinine, serum               79-year-old female status post cryoablation x2 to the same lesion that appears the lesion is decreasing in size we will repeat CT imaging in 6 months I have personally reviewed the CT imaging and agree with the report         [1]   Social History  Socioeconomic History    Marital status: Single   Tobacco Use    Smoking status: Former     Types: Cigarettes    Smokeless tobacco: Never    Tobacco comments:     quit 35 years ago   Substance and Sexual Activity    Alcohol use: Not Currently    Drug use: Never    Sexual activity: Not Currently     Partners: Male

## 2025-04-17 ENCOUNTER — OFFICE VISIT (OUTPATIENT)
Dept: HEMATOLOGY/ONCOLOGY | Facility: CLINIC | Age: 80
End: 2025-04-17
Payer: MEDICARE

## 2025-04-17 VITALS
HEIGHT: 68 IN | DIASTOLIC BLOOD PRESSURE: 72 MMHG | RESPIRATION RATE: 16 BRPM | WEIGHT: 137.38 LBS | HEART RATE: 98 BPM | OXYGEN SATURATION: 96 % | SYSTOLIC BLOOD PRESSURE: 138 MMHG | BODY MASS INDEX: 20.82 KG/M2

## 2025-04-17 DIAGNOSIS — Z71.83 ENCOUNTER FOR NONPROCREATIVE GENETIC COUNSELING AND TESTING: ICD-10-CM

## 2025-04-17 DIAGNOSIS — Z13.71 ENCOUNTER FOR NONPROCREATIVE GENETIC COUNSELING AND TESTING: ICD-10-CM

## 2025-04-17 DIAGNOSIS — C64.1 MALIGNANT NEOPLASM OF RIGHT KIDNEY, EXCEPT RENAL PELVIS: Primary | ICD-10-CM

## 2025-04-17 PROCEDURE — 3078F DIAST BP <80 MM HG: CPT | Mod: CPTII,,, | Performed by: NURSE PRACTITIONER

## 2025-04-17 PROCEDURE — 3288F FALL RISK ASSESSMENT DOCD: CPT | Mod: CPTII,,, | Performed by: NURSE PRACTITIONER

## 2025-04-17 PROCEDURE — 3075F SYST BP GE 130 - 139MM HG: CPT | Mod: CPTII,,, | Performed by: NURSE PRACTITIONER

## 2025-04-17 PROCEDURE — 1101F PT FALLS ASSESS-DOCD LE1/YR: CPT | Mod: CPTII,,, | Performed by: NURSE PRACTITIONER

## 2025-04-17 PROCEDURE — 99215 OFFICE O/P EST HI 40 MIN: CPT | Mod: S$PBB,,, | Performed by: NURSE PRACTITIONER

## 2025-04-17 PROCEDURE — 1126F AMNT PAIN NOTED NONE PRSNT: CPT | Mod: CPTII,,, | Performed by: NURSE PRACTITIONER

## 2025-04-17 PROCEDURE — 1159F MED LIST DOCD IN RCRD: CPT | Mod: CPTII,,, | Performed by: NURSE PRACTITIONER

## 2025-04-17 NOTE — PROGRESS NOTES
MEDICAL ONCOLOGY INITIAL CONSULTATION NOTE    Patient ID: Yoko Mann is a 79 y.o. female.    Chief Complaint: Renal cell carcinoma    HPI: Patient is a 79 year old female with PMH of DM, depression, arthritis, back pain, HLD, Diabetic neuropathy with recent diagnosis of renal cell carcinoma rising from the R kidney. Patient also was noted to have a lung nodule on imaging done few months back. Presents to our clinic today for further evaluation. 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 Persistent focal asymmetric density in the lower inner aspect of the right breast corresponds to a    0.7 cm irregularly marginated hypoechoic solid mass at the 3 o'clock position 3 cm from the nipple o   n today's ultrasound. Further evaluation with ultrasound-guided core biopsy is recommended.   2. Thickened right axillary lymph node with cortex up to 0.6 cm. Further evaluation with ultrasound-g   uided core biopsy is recommended.   3. Probably benign complicated cyst in the area of palpable concern at the 2 o'clock position of the   left breast 11 cm from the nipple. Follow-up left breast ultrasound in 6 months is recommended to ens   ure ongoing stability of benign features.     BI-RADS: 4: Suspicious               Past Medical History:   Diagnosis Date    Asthma     Depression     Diabetes mellitus      Family History   Problem Relation Name Age of Onset    Anuerysm Mother      Diabetes Father      Cancer Father      Prostate cancer Brother      Stomach cancer Brother      Stroke Brother      Tuberculosis Maternal Grandmother      Diabetes Paternal Grandmother      Diabetes Paternal Grandfather       Social History[1]  Past Surgical History:   Procedure Laterality Date    BACK SURGERY      CRYOABLATION Right     kidney    eyelid      FOOT SURGERY      bilat     HYSTERECTOMY      NECK SURGERY      x2         Review of systems:  Review of Systems   Constitutional:  Negative for activity change, appetite change, chills, diaphoresis, fatigue and unexpected weight change.   HENT:  Negative for congestion, facial swelling, hearing loss, mouth sores, trouble swallowing and voice change.    Eyes:  Negative for photophobia, pain, discharge and itching.   Respiratory:  Negative for apnea, cough, choking, chest tightness and shortness of breath.    Cardiovascular:  Negative for chest pain, palpitations and leg swelling.   Gastrointestinal:  Negative for abdominal distention, abdominal pain, anal bleeding and blood in stool.   Endocrine: Negative for cold intolerance, heat intolerance, polydipsia and polyphagia.   Genitourinary:  Negative for difficulty urinating, dysuria, flank pain and hematuria.   Musculoskeletal:  Negative for arthralgias, back pain, joint swelling, myalgias, neck pain and neck stiffness.   Skin:  Negative for color change, pallor and wound.   Allergic/Immunologic: Negative for environmental allergies, food allergies and immunocompromised state.   Neurological:  Negative for dizziness, seizures, facial asymmetry, speech difficulty, light-headedness, numbness and headaches.   Hematological:  Negative for adenopathy. Does not bruise/bleed easily.   Psychiatric/Behavioral:  Negative for agitation, behavioral problems, confusion, decreased concentration and sleep disturbance.                          Physical Exam  Vitals and nursing note reviewed.   Constitutional:       General: She is not in acute distress.     Appearance: Normal appearance. She is not ill-appearing.   HENT:      Head: Normocephalic and atraumatic.      Nose: No congestion or rhinorrhea.   Eyes:      General: No scleral icterus.     Extraocular Movements: Extraocular movements intact.      Pupils: Pupils are equal, round, and reactive to light.   Cardiovascular:      Rate and Rhythm: Normal rate  and regular rhythm.      Pulses: Normal pulses.      Heart sounds: Normal heart sounds. No murmur heard.     No gallop.   Pulmonary:      Effort: Pulmonary effort is normal. No respiratory distress.      Breath sounds: Normal breath sounds. No stridor. No wheezing or rhonchi.   Abdominal:      General: Bowel sounds are normal. There is no distension.      Palpations: There is no mass.      Tenderness: There is no abdominal tenderness. There is no guarding.   Musculoskeletal:         General: No swelling, tenderness, deformity or signs of injury. Normal range of motion.      Cervical back: Normal range of motion and neck supple. No rigidity. No muscular tenderness.      Right lower leg: No edema.      Left lower leg: No edema.   Skin:     General: Skin is warm.      Coloration: Skin is not jaundiced or pale.      Findings: No bruising or lesion.   Neurological:      General: No focal deficit present.      Mental Status: She is alert and oriented to person, place, and time.      Cranial Nerves: No cranial nerve deficit.      Sensory: No sensory deficit.      Motor: No weakness.      Gait: Gait normal.   Psychiatric:         Mood and Affect: Mood normal.         Behavior: Behavior normal.         Thought Content: Thought content normal.     Vitals:    04/17/25 1044   BP: 138/72   Pulse: 98   Resp: 16     Body surface area is 1.73 meters squared.    Assessment/Plan:      ECOG 1    Clear renal cell carcinoma arising from the right kidney:    == Grade 1  == Status post needle biopsy from the right kidney in September of 2024.  Status post cryoablation by Urology followed by repeat Cryo ablation February 12, 2025  == Patient was also noted to have lung nodule on imaging done few months back.  Discussed with patient in detail future management options for clear renal cell carcinoma.  Patient is a status post cryoablation for clear renal cell carcinoma arising from right kidney.  I will obtain PET scan for restaging and to  evaluate the lung nodule noted on prior imaging few months back  3/20/25: Pet/Ct on 3/11/25 shows mildly hypermetabolic asymmetric soft tissue in the right breast with mildly prominent and and hypermetabolic right axillary lymph nodes, to right paratracheal and anterior hilar nodes. Breast malignancy cannot be excluded. Right axillary and mediastinal nodes, nonspecific.     2. Right Breast lesion and axillary Bx negative on 2/26/25  focal asymmetric density in the lower inner aspect of the right breast corresponds to a  0.7 cm irregularly marginated hypoechoic solid mass at the 3 o'clock position 3 cm from the nipple on today's ultrasound. Further evaluation with ultrasound-guided core biopsy is recommended. Thickened right axillary lymph node with cortex up to 0.6 cm.     3. 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  Patients with VHL have a high risk for renal cancer, which is often diagnosed at young ages. VHL related kidney cancers are usually clear cell carcinomas. They are usually multi-focal and bilateral (affecting both kidneys).]  High risk for pancreatic neuroendocrine tumors (pancreatic NET) and paragangliomas (tumors that form from nerve tissue in the head, neck, or abdomen). These often occur at young ages. The majority of paragangliomas seen in VHL patients occur in the adrenal glands and are called pheochromocytomas. These tumors can secrete hormones that cause symptoms such as high blood pressure, rapid and/or abnormal heartbeat, headaches, sweating, nausea, fatigue and psychological upset.  VHL can be classified into one of five types (Type 1, Type 1B, Type 2A, Type 2B, and Type 2C). These classifications are based on the most common kinds of tumors seen in patients and the type of VHL gene mutation. Although there may be differences in the risk for various tumors and other features of VHL  based on these classifications, at the current time there are no differences in the medical management recommendations based on the type of VHL gene mutation.  Patients with VHL often have a wide-variety of other non-malignant, but potentially serious, features of the condition. These include hemangioblastomas of the central nervous system and the eye, endolymphatic sac tumors in the ears (which can lead to hearing loss), visceral cysts (especially in the kidneys, pancreas, and liver), and papillary cystadenomas of the broad ligament and epididymis (which can cause infertility in both men and women). These non-malignant features are often the first noticeable symptoms of VHL and can be life-threatening.  While individuals with VHL have a high risk of cancers and other serious health concerns, these can be greatly reduced with appropriate medical management. Guidelines from the Scientific Advisory Board of the VHL [place], National Comprehensive Cancer Network (NCCN), and the American Association for Cancer Research (AACR) are listed below. Since VHL is a complicated disease affecting a wide variety of organs, it is recommended that individuals with VHL be managed by a multidisciplinary team with expertise in medical genetics and the care of patients with this condition.      4. [facility] Genetic Testing: NEGATIVE for any clinically significant mutation  VHL is somatic mutation only, discussed with patient and family, that no cascade testing required      Plan:  [facility] genetic counseling  Will repeat PET/CT in Mid May   RTC with MD after PET/CT     Future Appointments   Date Time Provider Department Center   10/16/2025 10:40 AM Samuel Zamora MD [facility] UROLOGY  [place]               Advance Care Planning     Date: 02/20/2025    Power of   I initiated the process of voluntary advance care planning today and explained the importance of this process to the patient.  I introduced the concept of advance  directives to the patient, as well. Then the patient received detailed information about the importance of designating a Health Care Power of  (HCPOA). She was also instructed to communicate with this person about their wishes for future healthcare, should she become sick and lose decision-making capacity. The patient has not previously appointed a HCPOA. After our discussion, the patient has decided to complete a HCPOA and has appointed her    , health care agent:  Please see scanned on chart. I encouraged her to communicate with this person about their wishes for future healthcare, should she become sick and lose decision-making capacity.      A total of 20 min was spent on advance care planning, goals of care discussion, emotional support, formulating and communicating prognosis and exploring burden/benefit of various approaches of treatment. This discussion occurred on a fully voluntary basis with the verbal consent of the patient and/or family.           Total time spent in counseling and discussion about further management options including relevant lab work, treatment,  prognosis, medications and intended side effects was more than 60 minutes. More than 50% of the time was spent on counseling and coordination of care.  I spent a total of 60 minutes on the day of the visit.This includes face to face time and non-face to face time preparing to see the patient (eg, review of tests), Obtaining and/or reviewing separately obtained history, Documenting clinical information in the electronic or other health record, Independently interpreting resultsand communicating results to the patient/family/caregiver, or Care coordination.                [1]   Social History  Socioeconomic History    Marital status: Single   Tobacco Use    Smoking status: Former     Types: Cigarettes    Smokeless tobacco: Never    Tobacco comments:     quit 35 years ago   Substance and Sexual Activity    Alcohol use: Not Currently     Drug use: Never    Sexual activity: Not Currently     Partners: Male

## 2025-04-21 PROBLEM — Z13.71 ENCOUNTER FOR NONPROCREATIVE GENETIC COUNSELING AND TESTING: Status: ACTIVE | Noted: 2025-04-21

## 2025-04-21 PROBLEM — Z71.83 ENCOUNTER FOR NONPROCREATIVE GENETIC COUNSELING AND TESTING: Status: ACTIVE | Noted: 2025-04-21

## 2025-05-09 ENCOUNTER — TELEPHONE (OUTPATIENT)
Dept: HEMATOLOGY/ONCOLOGY | Facility: CLINIC | Age: 80
End: 2025-05-09
Payer: MEDICARE

## 2025-05-09 NOTE — TELEPHONE ENCOUNTER
"Spoke to the patients daughter who states that the patient has had a fall and is having "kidney spasms". Let them know that she needs to go to the ER for evaluation. State understanding. TTRN  "

## 2025-05-21 DIAGNOSIS — C64.9 RENAL CELL CARCINOMA, UNSPECIFIED LATERALITY: ICD-10-CM

## 2025-05-21 DIAGNOSIS — C64.1 MALIGNANT NEOPLASM OF RIGHT KIDNEY, EXCEPT RENAL PELVIS: Primary | ICD-10-CM

## 2025-05-29 ENCOUNTER — OFFICE VISIT (OUTPATIENT)
Dept: HEMATOLOGY/ONCOLOGY | Facility: CLINIC | Age: 80
End: 2025-05-29
Payer: MEDICARE

## 2025-05-29 VITALS
HEART RATE: 92 BPM | RESPIRATION RATE: 16 BRPM | HEIGHT: 68 IN | OXYGEN SATURATION: 94 % | BODY MASS INDEX: 20.16 KG/M2 | WEIGHT: 133 LBS | DIASTOLIC BLOOD PRESSURE: 72 MMHG | SYSTOLIC BLOOD PRESSURE: 145 MMHG

## 2025-05-29 DIAGNOSIS — C64.1 MALIGNANT NEOPLASM OF RIGHT KIDNEY, EXCEPT RENAL PELVIS: Primary | ICD-10-CM

## 2025-05-29 DIAGNOSIS — C64.9 RENAL CELL CARCINOMA, UNSPECIFIED LATERALITY: ICD-10-CM

## 2025-05-29 PROCEDURE — 1159F MED LIST DOCD IN RCRD: CPT | Mod: CPTII,,, | Performed by: INTERNAL MEDICINE

## 2025-05-29 PROCEDURE — 3288F FALL RISK ASSESSMENT DOCD: CPT | Mod: CPTII,,, | Performed by: INTERNAL MEDICINE

## 2025-05-29 PROCEDURE — 3078F DIAST BP <80 MM HG: CPT | Mod: CPTII,,, | Performed by: INTERNAL MEDICINE

## 2025-05-29 PROCEDURE — G2211 COMPLEX E/M VISIT ADD ON: HCPCS | Mod: S$PBB,,, | Performed by: INTERNAL MEDICINE

## 2025-05-29 PROCEDURE — 99215 OFFICE O/P EST HI 40 MIN: CPT | Mod: S$PBB,,, | Performed by: INTERNAL MEDICINE

## 2025-05-29 PROCEDURE — 1125F AMNT PAIN NOTED PAIN PRSNT: CPT | Mod: CPTII,,, | Performed by: INTERNAL MEDICINE

## 2025-05-29 PROCEDURE — 1160F RVW MEDS BY RX/DR IN RCRD: CPT | Mod: CPTII,,, | Performed by: INTERNAL MEDICINE

## 2025-05-29 PROCEDURE — 3077F SYST BP >= 140 MM HG: CPT | Mod: CPTII,,, | Performed by: INTERNAL MEDICINE

## 2025-05-29 PROCEDURE — 1100F PTFALLS ASSESS-DOCD GE2>/YR: CPT | Mod: CPTII,,, | Performed by: INTERNAL MEDICINE

## 2025-06-09 DIAGNOSIS — C64.1 MALIGNANT NEOPLASM OF RIGHT KIDNEY, EXCEPT RENAL PELVIS: Primary | ICD-10-CM
